# Patient Record
Sex: FEMALE | Race: WHITE | Employment: OTHER | URBAN - METROPOLITAN AREA
[De-identification: names, ages, dates, MRNs, and addresses within clinical notes are randomized per-mention and may not be internally consistent; named-entity substitution may affect disease eponyms.]

---

## 2018-06-11 ENCOUNTER — OFFICE VISIT (OUTPATIENT)
Dept: INTERNAL MEDICINE CLINIC | Age: 53
End: 2018-06-11

## 2018-06-11 VITALS
SYSTOLIC BLOOD PRESSURE: 110 MMHG | HEART RATE: 81 BPM | RESPIRATION RATE: 20 BRPM | HEIGHT: 66 IN | WEIGHT: 226 LBS | OXYGEN SATURATION: 97 % | TEMPERATURE: 98 F | BODY MASS INDEX: 36.32 KG/M2 | DIASTOLIC BLOOD PRESSURE: 70 MMHG

## 2018-06-11 DIAGNOSIS — R27.8 DYSSYNERGIA: ICD-10-CM

## 2018-06-11 DIAGNOSIS — E03.9 HYPOTHYROIDISM, UNSPECIFIED TYPE: ICD-10-CM

## 2018-06-11 DIAGNOSIS — E66.9 DIABETES MELLITUS TYPE 2 IN OBESE (HCC): Primary | ICD-10-CM

## 2018-06-11 DIAGNOSIS — E11.69 DIABETES MELLITUS TYPE 2 IN OBESE (HCC): Primary | ICD-10-CM

## 2018-06-11 DIAGNOSIS — E78.49 OTHER HYPERLIPIDEMIA: ICD-10-CM

## 2018-06-11 DIAGNOSIS — E66.9 DIABETES MELLITUS TYPE 2 IN OBESE (HCC): ICD-10-CM

## 2018-06-11 DIAGNOSIS — G93.2 PSEUDOTUMOR CEREBRI: ICD-10-CM

## 2018-06-11 DIAGNOSIS — Z86.69 HISTORY OF MIGRAINE: ICD-10-CM

## 2018-06-11 DIAGNOSIS — E11.69 DIABETES MELLITUS TYPE 2 IN OBESE (HCC): ICD-10-CM

## 2018-06-11 LAB
A/G RATIO: 1.3 (ref 1.1–2.2)
ALBUMIN SERPL-MCNC: 4.3 G/DL (ref 3.4–5)
ALP BLD-CCNC: 98 U/L (ref 40–129)
ALT SERPL-CCNC: 28 U/L (ref 10–40)
ANION GAP SERPL CALCULATED.3IONS-SCNC: 19 MMOL/L (ref 3–16)
AST SERPL-CCNC: 35 U/L (ref 15–37)
BILIRUB SERPL-MCNC: 0.4 MG/DL (ref 0–1)
BUN BLDV-MCNC: 9 MG/DL (ref 7–20)
CALCIUM SERPL-MCNC: 9.9 MG/DL (ref 8.3–10.6)
CHLORIDE BLD-SCNC: 102 MMOL/L (ref 99–110)
CHOLESTEROL, TOTAL: 189 MG/DL (ref 0–199)
CO2: 18 MMOL/L (ref 21–32)
CREAT SERPL-MCNC: 0.7 MG/DL (ref 0.6–1.1)
CREATININE URINE: 78.7 MG/DL (ref 28–259)
GFR AFRICAN AMERICAN: >60
GFR NON-AFRICAN AMERICAN: >60
GLOBULIN: 3.2 G/DL
GLUCOSE BLD-MCNC: 280 MG/DL (ref 70–99)
HBA1C MFR BLD: 7.6 %
HDLC SERPL-MCNC: 43 MG/DL (ref 40–60)
LDL CHOLESTEROL CALCULATED: 110 MG/DL
MICROALBUMIN UR-MCNC: <1.2 MG/DL
MICROALBUMIN/CREAT UR-RTO: NORMAL MG/G (ref 0–30)
POTASSIUM SERPL-SCNC: 4.2 MMOL/L (ref 3.5–5.1)
SODIUM BLD-SCNC: 139 MMOL/L (ref 136–145)
T4 FREE: 0.7 NG/DL (ref 0.9–1.8)
TOTAL PROTEIN: 7.5 G/DL (ref 6.4–8.2)
TRIGL SERPL-MCNC: 179 MG/DL (ref 0–150)
TSH SERPL DL<=0.05 MIU/L-ACNC: 40.83 UIU/ML (ref 0.27–4.2)
VLDLC SERPL CALC-MCNC: 36 MG/DL

## 2018-06-11 PROCEDURE — 83036 HEMOGLOBIN GLYCOSYLATED A1C: CPT | Performed by: NURSE PRACTITIONER

## 2018-06-11 PROCEDURE — 99204 OFFICE O/P NEW MOD 45 MIN: CPT | Performed by: NURSE PRACTITIONER

## 2018-06-11 PROCEDURE — G8417 CALC BMI ABV UP PARAM F/U: HCPCS | Performed by: NURSE PRACTITIONER

## 2018-06-11 PROCEDURE — 1036F TOBACCO NON-USER: CPT | Performed by: NURSE PRACTITIONER

## 2018-06-11 PROCEDURE — 3045F PR MOST RECENT HEMOGLOBIN A1C LEVEL 7.0-9.0%: CPT | Performed by: NURSE PRACTITIONER

## 2018-06-11 PROCEDURE — 3017F COLORECTAL CA SCREEN DOC REV: CPT | Performed by: NURSE PRACTITIONER

## 2018-06-11 PROCEDURE — 2022F DILAT RTA XM EVC RTNOPTHY: CPT | Performed by: NURSE PRACTITIONER

## 2018-06-11 PROCEDURE — G8427 DOCREV CUR MEDS BY ELIG CLIN: HCPCS | Performed by: NURSE PRACTITIONER

## 2018-06-11 RX ORDER — ATORVASTATIN CALCIUM 20 MG/1
20 TABLET, FILM COATED ORAL DAILY
COMMUNITY
End: 2018-09-17 | Stop reason: SDUPTHER

## 2018-06-11 RX ORDER — ESZOPICLONE 1 MG/1
1 TABLET, FILM COATED ORAL NIGHTLY
COMMUNITY

## 2018-06-11 RX ORDER — CLONAZEPAM 2 MG/1
2 TABLET ORAL
COMMUNITY
End: 2018-06-11 | Stop reason: CLARIF

## 2018-06-11 RX ORDER — LEVOTHYROXINE SODIUM 0.2 MG/1
200 TABLET ORAL DAILY
COMMUNITY
End: 2018-08-06

## 2018-06-11 RX ORDER — CLONAZEPAM 1 MG/1
1 TABLET ORAL 2 TIMES DAILY PRN
COMMUNITY
Start: 2013-08-12

## 2018-06-11 RX ORDER — IBUPROFEN 800 MG/1
800 TABLET ORAL EVERY 6 HOURS PRN
COMMUNITY
Start: 2016-05-26 | End: 2019-06-10 | Stop reason: ALTCHOICE

## 2018-06-11 RX ORDER — POLYETHYLENE GLYCOL 3350 17 G/17G
POWDER, FOR SOLUTION ORAL DAILY PRN
COMMUNITY

## 2018-06-11 RX ORDER — QUETIAPINE FUMARATE 400 MG/1
800 TABLET, FILM COATED ORAL NIGHTLY PRN
COMMUNITY

## 2018-06-11 RX ORDER — LITHIUM CARBONATE 450 MG
450 TABLET, EXTENDED RELEASE ORAL 2 TIMES DAILY
COMMUNITY

## 2018-06-11 RX ORDER — IBUPROFEN 200 MG
200 TABLET ORAL
COMMUNITY
End: 2018-06-11 | Stop reason: CLARIF

## 2018-06-11 RX ORDER — GLIPIZIDE 10 MG/1
TABLET ORAL
COMMUNITY
End: 2018-09-10 | Stop reason: SDUPTHER

## 2018-06-11 RX ORDER — CETIRIZINE HYDROCHLORIDE 10 MG/1
TABLET ORAL
COMMUNITY
Start: 2018-05-10

## 2018-06-11 RX ORDER — TOPIRAMATE 25 MG/1
25 TABLET ORAL 2 TIMES DAILY
COMMUNITY
Start: 2018-04-06

## 2018-06-11 RX ORDER — LEVOTHYROXINE SODIUM 0.1 MG/1
200 TABLET ORAL
COMMUNITY
End: 2018-06-11 | Stop reason: CLARIF

## 2018-06-11 RX ORDER — SENNA PLUS 8.6 MG/1
17.2 TABLET ORAL NIGHTLY PRN
COMMUNITY
Start: 2018-02-26 | End: 2019-06-21

## 2018-06-11 RX ORDER — HYDROXYZINE PAMOATE 50 MG/1
50 CAPSULE ORAL 4 TIMES DAILY PRN
COMMUNITY
Start: 2018-05-03 | End: 2019-09-24

## 2018-06-11 ASSESSMENT — PATIENT HEALTH QUESTIONNAIRE - PHQ9
SUM OF ALL RESPONSES TO PHQ9 QUESTIONS 1 & 2: 1
SUM OF ALL RESPONSES TO PHQ QUESTIONS 1-9: 1
2. FEELING DOWN, DEPRESSED OR HOPELESS: 0
1. LITTLE INTEREST OR PLEASURE IN DOING THINGS: 1

## 2018-06-11 ASSESSMENT — ENCOUNTER SYMPTOMS
CONSTIPATION: 1
SHORTNESS OF BREATH: 0
ABDOMINAL PAIN: 0

## 2018-06-12 ENCOUNTER — TELEPHONE (OUTPATIENT)
Dept: INTERNAL MEDICINE CLINIC | Age: 53
End: 2018-06-12

## 2018-06-12 DIAGNOSIS — E03.9 ACQUIRED HYPOTHYROIDISM: Primary | ICD-10-CM

## 2018-06-12 RX ORDER — LEVOTHYROXINE SODIUM 0.03 MG/1
25 TABLET ORAL DAILY
Qty: 30 TABLET | Refills: 1 | Status: SHIPPED | OUTPATIENT
Start: 2018-06-12 | End: 2018-08-05 | Stop reason: SDUPTHER

## 2018-07-02 ENCOUNTER — TELEPHONE (OUTPATIENT)
Dept: INTERNAL MEDICINE CLINIC | Age: 53
End: 2018-07-02

## 2018-07-03 ENCOUNTER — TELEPHONE (OUTPATIENT)
Dept: INTERNAL MEDICINE CLINIC | Age: 53
End: 2018-07-03

## 2018-07-09 NOTE — TELEPHONE ENCOUNTER
Pt called informed of Jacinta Tanner, Number given to scheduling to schedule a mammogram [phone]

## 2018-08-14 NOTE — TELEPHONE ENCOUNTER
Pt called in stating that she was told by her pharmacy that she needs to get labs done with her PCP before she can get her levothyroxine refilled - this reads true from previous encounter notes. Labs are in the system for TSH w/o Reflex & T4, Free. Pt states she will come in early part of next week to get her thyroid bloodwork done but that she is running low on both her levothyroxine & metformin. Pt did not disclose to me how much medication she has left.     [facility] uses [facility] [place] -- [phone]

## 2018-08-16 DIAGNOSIS — E03.9 ACQUIRED HYPOTHYROIDISM: ICD-10-CM

## 2018-08-17 DIAGNOSIS — E03.9 ACQUIRED HYPOTHYROIDISM: ICD-10-CM

## 2018-08-17 LAB
T4 FREE: 1.2 NG/DL (ref 0.9–1.8)
TSH SERPL DL<=0.05 MIU/L-ACNC: 0.95 UIU/ML (ref 0.27–4.2)

## 2018-08-28 ENCOUNTER — TELEPHONE (OUTPATIENT)
Dept: INTERNAL MEDICINE CLINIC | Age: 53
End: 2018-08-28

## 2018-08-28 NOTE — TELEPHONE ENCOUNTER
Pt called in as a red flag, short of breath , chest pain x2 days Pt advised to go to Emergency Department , Pt states she will go

## 2018-08-31 ENCOUNTER — TELEPHONE (OUTPATIENT)
Dept: INTERNAL MEDICINE CLINIC | Age: 53
End: 2018-08-31

## 2018-09-04 ENCOUNTER — TELEPHONE (OUTPATIENT)
Dept: INTERNAL MEDICINE CLINIC | Age: 53
End: 2018-09-04

## 2018-09-04 NOTE — TELEPHONE ENCOUNTER
Bradley Izaguirre from [facility] is a  for Pt and is wanting to just introduce herself and be added to Pt demographics for future visits and information   Is helping Pt manage care for her migraines    Contact information    [facility]  [address].  [place], [address]    [phone]    Fax [phone] (make sure to put last name Atkins on Fax)

## 2018-09-05 DIAGNOSIS — E03.9 ACQUIRED HYPOTHYROIDISM: ICD-10-CM

## 2018-09-05 RX ORDER — LEVOTHYROXINE SODIUM 0.03 MG/1
25 TABLET ORAL DAILY
Qty: 30 TABLET | Refills: 0 | Status: SHIPPED | OUTPATIENT
Start: 2018-09-05 | End: 2018-10-04 | Stop reason: SDUPTHER

## 2018-09-10 RX ORDER — GLIPIZIDE 10 MG/1
TABLET ORAL
Qty: 60 TABLET | Refills: 0 | Status: SHIPPED | OUTPATIENT
Start: 2018-09-10 | End: 2018-10-06 | Stop reason: SDUPTHER

## 2018-09-10 NOTE — TELEPHONE ENCOUNTER
Medication:   Requested Prescriptions     Pending Prescriptions Disp Refills    metFORMIN (GLUCOPHAGE) 500 MG tablet 60 tablet      Sig: Take 2 tablets by mouth 2 times daily (with meals)    glipiZIDE (GLUCOTROL) 10 MG tablet 60 tablet        Last Filled:  New     Patient Phone Number: [phone] (home)     Last appt: 6/11/2018   Next appt: 9/17/2018    Last Labs DM:   Lab Results   Component Value Date    LABA1C 7.6 06/11/2018     Last Lipid:   Lab Results   Component Value Date    CHOL 189 06/11/2018    TRIG 179 06/11/2018    HDL 43 06/11/2018    LDLCALC 110 06/11/2018     Last PSA: No results found for: PSA  Last Thyroid:   Lab Results   Component Value Date    TSH 0.95 08/16/2018    T4FREE 1.2 08/16/2018       Last OARRS: No flowsheet data found. Preferred Pharmacy:   [facility]/pharmacy #[id number] [address]. Shilpi Carrillo [phone] - F [phone]  [facility].   [address] [place] [address]  Phone: [phone] Fax: [phone]

## 2018-09-17 ENCOUNTER — OFFICE VISIT (OUTPATIENT)
Dept: INTERNAL MEDICINE CLINIC | Age: 53
End: 2018-09-17

## 2018-09-17 VITALS
HEIGHT: 66 IN | OXYGEN SATURATION: 97 % | SYSTOLIC BLOOD PRESSURE: 120 MMHG | WEIGHT: 227 LBS | BODY MASS INDEX: 36.48 KG/M2 | DIASTOLIC BLOOD PRESSURE: 80 MMHG | HEART RATE: 82 BPM

## 2018-09-17 DIAGNOSIS — E66.9 DIABETES MELLITUS TYPE 2 IN OBESE (HCC): Primary | ICD-10-CM

## 2018-09-17 DIAGNOSIS — E11.69 DIABETES MELLITUS TYPE 2 IN OBESE (HCC): Primary | ICD-10-CM

## 2018-09-17 DIAGNOSIS — E78.1 HIGH TRIGLYCERIDES: ICD-10-CM

## 2018-09-17 DIAGNOSIS — Z12.39 BREAST CANCER SCREENING: ICD-10-CM

## 2018-09-17 DIAGNOSIS — E78.2 MIXED HYPERLIPIDEMIA: ICD-10-CM

## 2018-09-17 DIAGNOSIS — E03.9 ACQUIRED HYPOTHYROIDISM: ICD-10-CM

## 2018-09-17 LAB — HBA1C MFR BLD: 8.2 %

## 2018-09-17 PROCEDURE — 3045F PR MOST RECENT HEMOGLOBIN A1C LEVEL 7.0-9.0%: CPT | Performed by: NURSE PRACTITIONER

## 2018-09-17 PROCEDURE — 2022F DILAT RTA XM EVC RTNOPTHY: CPT | Performed by: NURSE PRACTITIONER

## 2018-09-17 PROCEDURE — 99214 OFFICE O/P EST MOD 30 MIN: CPT | Performed by: NURSE PRACTITIONER

## 2018-09-17 PROCEDURE — 1036F TOBACCO NON-USER: CPT | Performed by: NURSE PRACTITIONER

## 2018-09-17 PROCEDURE — G8427 DOCREV CUR MEDS BY ELIG CLIN: HCPCS | Performed by: NURSE PRACTITIONER

## 2018-09-17 PROCEDURE — 83036 HEMOGLOBIN GLYCOSYLATED A1C: CPT | Performed by: NURSE PRACTITIONER

## 2018-09-17 PROCEDURE — G8417 CALC BMI ABV UP PARAM F/U: HCPCS | Performed by: NURSE PRACTITIONER

## 2018-09-17 PROCEDURE — 3017F COLORECTAL CA SCREEN DOC REV: CPT | Performed by: NURSE PRACTITIONER

## 2018-09-17 RX ORDER — ATORVASTATIN CALCIUM 40 MG/1
40 TABLET, FILM COATED ORAL NIGHTLY
Qty: 90 TABLET | Refills: 0 | Status: SHIPPED | OUTPATIENT
Start: 2018-09-17 | End: 2018-12-08 | Stop reason: SDUPTHER

## 2018-09-17 RX ORDER — LEVOTHYROXINE SODIUM 0.2 MG/1
200 TABLET ORAL DAILY
COMMUNITY
End: 2018-11-01 | Stop reason: SDUPTHER

## 2018-09-17 ASSESSMENT — ENCOUNTER SYMPTOMS
ABDOMINAL PAIN: 1
ABDOMINAL DISTENTION: 1
SHORTNESS OF BREATH: 0

## 2018-09-17 NOTE — PATIENT INSTRUCTIONS
Assessment/Plan:   Results for POC orders placed in visit on 09/17/18   POCT glycosylated hemoglobin (Hb A1C)   Result Value Ref Range    Hemoglobin A1C 8.2 %       1. Diabetes mellitus type 2 in obese [facility])  Not at goal  Add the Trulicity (dulaglutide). Use as prescribed. If you need assistance, come in the office for your first injection. Please see [facility] soon. - POCT glycosylated hemoglobin (Hb A1C)  - Dulaglutide 0.75 MG/0.5ML SOPN; Inject 0.75 mg into the skin once a week  Dispense: 4 pen; Refill: 0    2. Acquired hypothyroidism  Stable    3. Mixed hyperlipidemia  Not at goal.  Continue diabetic diet. Increase Lipitor dose to 40mg as prescribed. - atorvastatin (LIPITOR) 40 MG tablet; Take 1 tablet by mouth nightly  Dispense: 90 tablet; Refill: 0    4. High triglycerides  Not at goal.  Continue diabetic diet. Increase Lipitor dose to 40mg as prescribed. - atorvastatin (LIPITOR) 40 MG tablet; Take 1 tablet by mouth nightly  Dispense: 90 tablet; Refill: 0    5. Breast cancer screening  Please have this done soon. - [facility]AN[facility] DIGITAL SCREEN W OR WO CAD BILATERAL; Future        Check with your Neurosurgeon regarding which vaccines are ok (influenza, Tdap, Pneumococcal 23 valent)      Discussed medications with patient, who voiced understanding of their use and indications. All questions answered. Pt is advised to call if symptoms worsen or do not improve.

## 2018-09-24 ENCOUNTER — TELEPHONE (OUTPATIENT)
Dept: INTERNAL MEDICINE CLINIC | Age: 53
End: 2018-09-24

## 2018-09-26 DIAGNOSIS — E66.9 DIABETES MELLITUS TYPE 2 IN OBESE (HCC): Primary | ICD-10-CM

## 2018-09-26 DIAGNOSIS — E11.69 DIABETES MELLITUS TYPE 2 IN OBESE (HCC): Primary | ICD-10-CM

## 2018-09-26 NOTE — TELEPHONE ENCOUNTER
Trulicity rx'd for her diabetes and coverage was denied. I will change this to Victoza, which is a daily medication. Please let her know of the change. Thanks.

## 2018-09-28 NOTE — TELEPHONE ENCOUNTER
Medication:   Requested Prescriptions     Pending Prescriptions Disp Refills    metFORMIN (GLUCOPHAGE) 500 MG tablet [Pharmacy Med Name: METFORMIN  MG TABLET] 60 tablet 0     Sig: TAKE 2 TABLETS BY MOUTH TWICE A DAY WITH MEALS       Last Filled:      Patient Phone Number: [phone] (home)     Last appt: 9/17/2018   Next appt: Visit date not found    Last Labs DM:   Lab Results   Component Value Date    LABA1C 8.2 09/17/2018       Last OARRS: No flowsheet data found. Preferred Pharmacy:   [facility]/pharmacy #[id number] [address]. [facility] [phone] - F [phone]  [facility].   [address] [place] [address] [place] [address]  Phone: [phone] Fax: [phone]

## 2018-10-04 DIAGNOSIS — E03.9 ACQUIRED HYPOTHYROIDISM: ICD-10-CM

## 2018-10-04 RX ORDER — LEVOTHYROXINE SODIUM 0.03 MG/1
25 TABLET ORAL DAILY
Qty: 90 TABLET | Refills: 1 | Status: SHIPPED | OUTPATIENT
Start: 2018-10-04 | End: 2019-03-23 | Stop reason: SDUPTHER

## 2018-10-08 RX ORDER — GLIPIZIDE 10 MG/1
TABLET ORAL
Qty: 60 TABLET | Refills: 2 | Status: SHIPPED | OUTPATIENT
Start: 2018-10-08 | End: 2018-12-17 | Stop reason: SDUPTHER

## 2018-10-17 PROBLEM — Z12.39 BREAST CANCER SCREENING: Status: RESOLVED | Noted: 2018-09-17 | Resolved: 2018-10-17

## 2018-11-01 RX ORDER — LEVOTHYROXINE SODIUM 0.2 MG/1
TABLET ORAL
Qty: 90 TABLET | Refills: 0 | Status: SHIPPED | OUTPATIENT
Start: 2018-11-01 | End: 2018-12-17 | Stop reason: SDUPTHER

## 2018-11-01 NOTE — TELEPHONE ENCOUNTER
Medication:   Requested Prescriptions     Pending Prescriptions Disp Refills    levothyroxine (SYNTHROID) 200 MCG tablet [Pharmacy Med Name: LEVOTHYROXINE 200 MCG TABLET] 90 tablet 0     Sig: TAKE 1 TABLET BY MOUTH EVERY DAY     Last Filled:  10/4/18    Last appt: 9/17/18   Next appt: 12/17/18    Last Thyroid:   Lab Results   Component Value Date    TSH 0.95 08/16/2018    T4FREE 1.2 08/16/2018

## 2018-11-26 ENCOUNTER — CLINICAL DOCUMENTATION (OUTPATIENT)
Dept: INTERNAL MEDICINE CLINIC | Age: 53
End: 2018-11-26

## 2018-12-08 DIAGNOSIS — E78.2 MIXED HYPERLIPIDEMIA: ICD-10-CM

## 2018-12-08 DIAGNOSIS — E78.1 HIGH TRIGLYCERIDES: ICD-10-CM

## 2018-12-10 RX ORDER — ATORVASTATIN CALCIUM 40 MG/1
TABLET, FILM COATED ORAL
Qty: 90 TABLET | Refills: 0 | Status: SHIPPED | OUTPATIENT
Start: 2018-12-10 | End: 2019-03-03 | Stop reason: SDUPTHER

## 2018-12-10 NOTE — TELEPHONE ENCOUNTER
Medication:   Requested Prescriptions     Pending Prescriptions Disp Refills    atorvastatin (LIPITOR) 40 MG tablet [Pharmacy Med Name: ATORVASTATIN 40 MG TABLET] 90 tablet 0     Sig: TAKE 1 TABLET BY MOUTH EVERY DAY AT NIGHT       Last Filled:  09/17/18    Patient Phone Number: [phone] (home)     Last appt: 9/17/2018   Next appt:  12/17/18    Last Lipid:   Lab Results   Component Value Date    CHOL 189 06/11/2018    TRIG 179 06/11/2018    HDL 43 06/11/2018    LDLCALC 110 06/11/2018       Last OARRS: No flowsheet data found. Preferred Pharmacy:   [facility]/pharmacy #[id number] [address]. Vannessa Rule [phone] - F [phone]  [facility].   [address] [place] [address] [place] [address]  Phone: [phone] Fax: [phone]

## 2018-12-17 ENCOUNTER — OFFICE VISIT (OUTPATIENT)
Dept: INTERNAL MEDICINE CLINIC | Age: 53
End: 2018-12-17
Payer: MEDICAID

## 2018-12-17 VITALS
HEIGHT: 66 IN | HEART RATE: 88 BPM | OXYGEN SATURATION: 98 % | DIASTOLIC BLOOD PRESSURE: 82 MMHG | WEIGHT: 229 LBS | SYSTOLIC BLOOD PRESSURE: 120 MMHG | BODY MASS INDEX: 36.8 KG/M2

## 2018-12-17 DIAGNOSIS — E78.49 OTHER HYPERLIPIDEMIA: ICD-10-CM

## 2018-12-17 DIAGNOSIS — Z23 NEED FOR PROPHYLACTIC VACCINATION AGAINST STREPTOCOCCUS PNEUMONIAE (PNEUMOCOCCUS): ICD-10-CM

## 2018-12-17 DIAGNOSIS — Z23 NEEDS FLU SHOT: ICD-10-CM

## 2018-12-17 DIAGNOSIS — R27.8 DYSSYNERGIA: ICD-10-CM

## 2018-12-17 DIAGNOSIS — E11.69 DIABETES MELLITUS TYPE 2 IN OBESE (HCC): ICD-10-CM

## 2018-12-17 DIAGNOSIS — E66.9 DIABETES MELLITUS TYPE 2 IN OBESE (HCC): Primary | ICD-10-CM

## 2018-12-17 DIAGNOSIS — E11.69 DIABETES MELLITUS TYPE 2 IN OBESE (HCC): Primary | ICD-10-CM

## 2018-12-17 DIAGNOSIS — E66.9 DIABETES MELLITUS TYPE 2 IN OBESE (HCC): ICD-10-CM

## 2018-12-17 DIAGNOSIS — K43.9 VENTRAL HERNIA WITHOUT OBSTRUCTION OR GANGRENE: ICD-10-CM

## 2018-12-17 LAB
A/G RATIO: 1.4 (ref 1.1–2.2)
ALBUMIN SERPL-MCNC: 4.4 G/DL (ref 3.4–5)
ALP BLD-CCNC: 88 U/L (ref 40–129)
ALT SERPL-CCNC: 33 U/L (ref 10–40)
ANION GAP SERPL CALCULATED.3IONS-SCNC: 18 MMOL/L (ref 3–16)
AST SERPL-CCNC: 38 U/L (ref 15–37)
BILIRUB SERPL-MCNC: 0.5 MG/DL (ref 0–1)
BUN BLDV-MCNC: 11 MG/DL (ref 7–20)
CALCIUM SERPL-MCNC: 9.9 MG/DL (ref 8.3–10.6)
CHLORIDE BLD-SCNC: 105 MMOL/L (ref 99–110)
CHOLESTEROL, TOTAL: 144 MG/DL (ref 0–199)
CO2: 18 MMOL/L (ref 21–32)
CREAT SERPL-MCNC: 0.7 MG/DL (ref 0.6–1.1)
GFR AFRICAN AMERICAN: >60
GFR NON-AFRICAN AMERICAN: >60
GLOBULIN: 3.2 G/DL
GLUCOSE BLD-MCNC: 204 MG/DL (ref 70–99)
HBA1C MFR BLD: 7.5 %
HDLC SERPL-MCNC: 31 MG/DL (ref 40–60)
LDL CHOLESTEROL CALCULATED: 80 MG/DL
POTASSIUM SERPL-SCNC: 4.5 MMOL/L (ref 3.5–5.1)
SODIUM BLD-SCNC: 141 MMOL/L (ref 136–145)
TOTAL CK: 85 U/L (ref 26–192)
TOTAL PROTEIN: 7.6 G/DL (ref 6.4–8.2)
TRIGL SERPL-MCNC: 166 MG/DL (ref 0–150)
VLDLC SERPL CALC-MCNC: 33 MG/DL

## 2018-12-17 PROCEDURE — 90471 IMMUNIZATION ADMIN: CPT | Performed by: NURSE PRACTITIONER

## 2018-12-17 PROCEDURE — G8417 CALC BMI ABV UP PARAM F/U: HCPCS | Performed by: NURSE PRACTITIONER

## 2018-12-17 PROCEDURE — 90472 IMMUNIZATION ADMIN EACH ADD: CPT | Performed by: NURSE PRACTITIONER

## 2018-12-17 PROCEDURE — G8482 FLU IMMUNIZE ORDER/ADMIN: HCPCS | Performed by: NURSE PRACTITIONER

## 2018-12-17 PROCEDURE — 90688 IIV4 VACCINE SPLT 0.5 ML IM: CPT | Performed by: NURSE PRACTITIONER

## 2018-12-17 PROCEDURE — 3017F COLORECTAL CA SCREEN DOC REV: CPT | Performed by: NURSE PRACTITIONER

## 2018-12-17 PROCEDURE — 99214 OFFICE O/P EST MOD 30 MIN: CPT | Performed by: NURSE PRACTITIONER

## 2018-12-17 PROCEDURE — G8427 DOCREV CUR MEDS BY ELIG CLIN: HCPCS | Performed by: NURSE PRACTITIONER

## 2018-12-17 PROCEDURE — 3045F PR MOST RECENT HEMOGLOBIN A1C LEVEL 7.0-9.0%: CPT | Performed by: NURSE PRACTITIONER

## 2018-12-17 PROCEDURE — 1036F TOBACCO NON-USER: CPT | Performed by: NURSE PRACTITIONER

## 2018-12-17 PROCEDURE — 2022F DILAT RTA XM EVC RTNOPTHY: CPT | Performed by: NURSE PRACTITIONER

## 2018-12-17 PROCEDURE — 83036 HEMOGLOBIN GLYCOSYLATED A1C: CPT | Performed by: NURSE PRACTITIONER

## 2018-12-17 PROCEDURE — 90732 PPSV23 VACC 2 YRS+ SUBQ/IM: CPT | Performed by: NURSE PRACTITIONER

## 2018-12-17 RX ORDER — GLIPIZIDE 10 MG/1
TABLET ORAL
Qty: 180 TABLET | Refills: 1 | Status: SHIPPED | OUTPATIENT
Start: 2018-12-17 | End: 2019-04-03

## 2018-12-17 RX ORDER — LEVOTHYROXINE SODIUM 0.2 MG/1
TABLET ORAL
Qty: 90 TABLET | Refills: 1 | Status: SHIPPED | OUTPATIENT
Start: 2018-12-17 | End: 2019-08-07 | Stop reason: SDUPTHER

## 2018-12-17 ASSESSMENT — ENCOUNTER SYMPTOMS
SHORTNESS OF BREATH: 0
ABDOMINAL PAIN: 0

## 2018-12-17 NOTE — PROGRESS NOTES
Subjective:   Joe Jorge is a 50 y.o. female. YOB: 1965    Date of Visit:  12/17/2018    Chief Complaint   Patient presents with    Hyperlipidemia    Hypothyroidism    Diabetes       HPI     Diabetes mellitus type 2 in obese [facility])  Not at goal last eval in Sept.  Trulicity (dulaglutide) was added. Not covered by [employer]. Changed to Victoza. Tolerating well. She is working hard on her diet. -140's overall. She has had some around 100 as well. Had some lows in 30's. Rare. Acquired hypothyroidism  Stable    Mixed hyperlipidemia, high triglycerides-  Was not at goal.  Diet stressed. Lipitor dose was increased to 40mg as prescribed. Tolerating this well. She is fasting today. She is attending a seminar for gastric bypass at [facility] - for diet. Mom recently dx'd with CAD. Stents placed. [facility] told her she was not a candidate for surgery to repair her hernia.        Past Medical History:   Diagnosis Date    Anxiety     Bipolar disorder ([facility]     Depression     GERD (gastroesophageal reflux disease)     HPV (human papilloma virus) infection 2016    noted in records    Hyperlipidemia     Hypothyroidism     Obesity     Pseudotumor cerebri     Sleep apnea     Type 2 diabetes mellitus without complication ([facility]       Family History   Problem Relation Age of Onset    Cirrhosis Mother         nonalcoholic    Mental Illness Father         paranoid schizophrenia    Diabetes Brother      Social History   Substance Use Topics    Smoking status: Never Smoker    Smokeless tobacco: Never Used    Alcohol use No     Allergies   Allergen Reactions    Dhea Anaphylaxis     Pt states she coded    Other Hives and Anaphylaxis     Ended up in ICU  \"diazide\"    Penicillins Anaphylaxis    [facility] Other (See Comments)     IVP dye  \"coded\"    Diazoxide Hives    Tetracycline Hives     \"gave me the pseudo tumor\"    Valproic Acid     Acetazolamide Rash and Hives

## 2018-12-20 DIAGNOSIS — E66.9 DIABETES MELLITUS TYPE 2 IN OBESE (HCC): ICD-10-CM

## 2018-12-20 DIAGNOSIS — E11.69 DIABETES MELLITUS TYPE 2 IN OBESE (HCC): ICD-10-CM

## 2019-01-23 ENCOUNTER — TELEPHONE (OUTPATIENT)
Dept: ORTHOPEDIC SURGERY | Age: 54
End: 2019-01-23

## 2019-01-23 ENCOUNTER — OFFICE VISIT (OUTPATIENT)
Dept: ORTHOPEDIC SURGERY | Age: 54
End: 2019-01-23
Payer: MEDICAID

## 2019-01-23 ENCOUNTER — OFFICE VISIT (OUTPATIENT)
Dept: INTERNAL MEDICINE CLINIC | Age: 54
End: 2019-01-23
Payer: MEDICAID

## 2019-01-23 VITALS
HEART RATE: 89 BPM | HEIGHT: 66 IN | BODY MASS INDEX: 36.26 KG/M2 | SYSTOLIC BLOOD PRESSURE: 122 MMHG | WEIGHT: 225.6 LBS | DIASTOLIC BLOOD PRESSURE: 75 MMHG

## 2019-01-23 VITALS
SYSTOLIC BLOOD PRESSURE: 128 MMHG | HEIGHT: 66 IN | WEIGHT: 221 LBS | HEART RATE: 78 BPM | DIASTOLIC BLOOD PRESSURE: 82 MMHG | OXYGEN SATURATION: 99 % | BODY MASS INDEX: 35.52 KG/M2

## 2019-01-23 DIAGNOSIS — M75.101 TEAR OF RIGHT ROTATOR CUFF, UNSPECIFIED TEAR EXTENT: Primary | ICD-10-CM

## 2019-01-23 DIAGNOSIS — G89.29 CHRONIC PAIN OF BOTH SHOULDERS: ICD-10-CM

## 2019-01-23 DIAGNOSIS — M25.511 CHRONIC PAIN OF BOTH SHOULDERS: ICD-10-CM

## 2019-01-23 DIAGNOSIS — M25.512 ACUTE PAIN OF BOTH SHOULDERS: ICD-10-CM

## 2019-01-23 DIAGNOSIS — M75.82 TENDONITIS OF LEFT ROTATOR CUFF: ICD-10-CM

## 2019-01-23 DIAGNOSIS — S49.91XD BILATERAL SHOULDER INJURY, SUBSEQUENT ENCOUNTER: Primary | ICD-10-CM

## 2019-01-23 DIAGNOSIS — M25.511 ACUTE PAIN OF BOTH SHOULDERS: ICD-10-CM

## 2019-01-23 DIAGNOSIS — M19.012 ARTHRITIS OF LEFT ACROMIOCLAVICULAR JOINT: ICD-10-CM

## 2019-01-23 DIAGNOSIS — M25.512 CHRONIC PAIN OF BOTH SHOULDERS: ICD-10-CM

## 2019-01-23 DIAGNOSIS — S49.92XD BILATERAL SHOULDER INJURY, SUBSEQUENT ENCOUNTER: Primary | ICD-10-CM

## 2019-01-23 PROCEDURE — 3017F COLORECTAL CA SCREEN DOC REV: CPT | Performed by: NURSE PRACTITIONER

## 2019-01-23 PROCEDURE — 1036F TOBACCO NON-USER: CPT | Performed by: NURSE PRACTITIONER

## 2019-01-23 PROCEDURE — G8427 DOCREV CUR MEDS BY ELIG CLIN: HCPCS | Performed by: NURSE PRACTITIONER

## 2019-01-23 PROCEDURE — 99203 OFFICE O/P NEW LOW 30 MIN: CPT | Performed by: PHYSICIAN ASSISTANT

## 2019-01-23 PROCEDURE — G8417 CALC BMI ABV UP PARAM F/U: HCPCS | Performed by: PHYSICIAN ASSISTANT

## 2019-01-23 PROCEDURE — G8482 FLU IMMUNIZE ORDER/ADMIN: HCPCS | Performed by: PHYSICIAN ASSISTANT

## 2019-01-23 PROCEDURE — 99213 OFFICE O/P EST LOW 20 MIN: CPT | Performed by: NURSE PRACTITIONER

## 2019-01-23 PROCEDURE — G8427 DOCREV CUR MEDS BY ELIG CLIN: HCPCS | Performed by: PHYSICIAN ASSISTANT

## 2019-01-23 PROCEDURE — 3017F COLORECTAL CA SCREEN DOC REV: CPT | Performed by: PHYSICIAN ASSISTANT

## 2019-01-23 PROCEDURE — G8417 CALC BMI ABV UP PARAM F/U: HCPCS | Performed by: NURSE PRACTITIONER

## 2019-01-23 PROCEDURE — 1036F TOBACCO NON-USER: CPT | Performed by: PHYSICIAN ASSISTANT

## 2019-01-23 PROCEDURE — G8482 FLU IMMUNIZE ORDER/ADMIN: HCPCS | Performed by: NURSE PRACTITIONER

## 2019-01-24 ENCOUNTER — TELEPHONE (OUTPATIENT)
Dept: ORTHOPEDIC SURGERY | Age: 54
End: 2019-01-24

## 2019-01-25 RX ORDER — METHYLPREDNISOLONE 4 MG/1
TABLET ORAL
Qty: 1 KIT | Refills: 0 | Status: SHIPPED | OUTPATIENT
Start: 2019-01-25 | End: 2019-02-04 | Stop reason: CLARIF

## 2019-01-28 ENCOUNTER — TELEPHONE (OUTPATIENT)
Dept: ORTHOPEDIC SURGERY | Age: 54
End: 2019-01-28

## 2019-01-28 RX ORDER — NAPROXEN 500 MG/1
500 TABLET ORAL 2 TIMES DAILY WITH MEALS
Qty: 30 TABLET | Refills: 0 | Status: SHIPPED | OUTPATIENT
Start: 2019-01-28 | End: 2019-06-10

## 2019-01-29 ENCOUNTER — CLINICAL DOCUMENTATION (OUTPATIENT)
Dept: INTERNAL MEDICINE CLINIC | Age: 54
End: 2019-01-29

## 2019-02-01 ENCOUNTER — TELEPHONE (OUTPATIENT)
Dept: ORTHOPEDIC SURGERY | Age: 54
End: 2019-02-01

## 2019-02-01 ENCOUNTER — TELEPHONE (OUTPATIENT)
Dept: INTERNAL MEDICINE CLINIC | Age: 54
End: 2019-02-01

## 2019-02-01 DIAGNOSIS — M75.101 TEAR OF RIGHT ROTATOR CUFF, UNSPECIFIED TEAR EXTENT: Primary | ICD-10-CM

## 2019-02-04 ENCOUNTER — OFFICE VISIT (OUTPATIENT)
Dept: INTERNAL MEDICINE CLINIC | Age: 54
End: 2019-02-04
Payer: MEDICAID

## 2019-02-04 ENCOUNTER — HOSPITAL ENCOUNTER (OUTPATIENT)
Dept: CT IMAGING | Age: 54
Discharge: HOME | End: 2019-02-04
Payer: MEDICAID

## 2019-02-04 VITALS
WEIGHT: 229 LBS | DIASTOLIC BLOOD PRESSURE: 80 MMHG | HEART RATE: 82 BPM | SYSTOLIC BLOOD PRESSURE: 110 MMHG | TEMPERATURE: 98.1 F | BODY MASS INDEX: 36.8 KG/M2 | OXYGEN SATURATION: 98 % | HEIGHT: 66 IN

## 2019-02-04 DIAGNOSIS — Z86.19 HISTORY OF HEPATITIS A VIRUS INFECTION: ICD-10-CM

## 2019-02-04 DIAGNOSIS — R10.11 RIGHT UPPER QUADRANT ABDOMINAL PAIN: ICD-10-CM

## 2019-02-04 DIAGNOSIS — R14.0 ABDOMINAL DISTENSION: ICD-10-CM

## 2019-02-04 DIAGNOSIS — R19.5 PALE STOOL: ICD-10-CM

## 2019-02-04 DIAGNOSIS — K62.5 RECTAL BLEEDING: ICD-10-CM

## 2019-02-04 DIAGNOSIS — R10.11 RIGHT UPPER QUADRANT ABDOMINAL PAIN: Primary | ICD-10-CM

## 2019-02-04 LAB
BASOPHILS ABSOLUTE: 0 K/UL (ref 0–0.2)
BASOPHILS RELATIVE PERCENT: 0.4 %
EOSINOPHILS ABSOLUTE: 0.2 K/UL (ref 0–0.6)
EOSINOPHILS RELATIVE PERCENT: 4.4 %
HAV IGM SER IA-ACNC: NORMAL
HCT VFR BLD CALC: 35.5 % (ref 36–48)
HEMOGLOBIN: 11.8 G/DL (ref 12–16)
HEPATITIS B CORE IGM ANTIBODY: NORMAL
HEPATITIS B SURFACE ANTIGEN INTERPRETATION: NORMAL
HEPATITIS C ANTIBODY INTERPRETATION: NORMAL
LYMPHOCYTES ABSOLUTE: 1.3 K/UL (ref 1–5.1)
LYMPHOCYTES RELATIVE PERCENT: 25.6 %
MCH RBC QN AUTO: 28.2 PG (ref 26–34)
MCHC RBC AUTO-ENTMCNC: 33.4 G/DL (ref 31–36)
MCV RBC AUTO: 84.4 FL (ref 80–100)
MONOCYTES ABSOLUTE: 0.5 K/UL (ref 0–1.3)
MONOCYTES RELATIVE PERCENT: 9.9 %
NEUTROPHILS ABSOLUTE: 3.1 K/UL (ref 1.7–7.7)
NEUTROPHILS RELATIVE PERCENT: 59.7 %
PDW BLD-RTO: 16.9 % (ref 12.4–15.4)
PLATELET # BLD: 76 K/UL (ref 135–450)
PLATELET SLIDE REVIEW: ABNORMAL
PMV BLD AUTO: 9.5 FL (ref 5–10.5)
RBC # BLD: 4.2 M/UL (ref 4–5.2)
RBC # BLD: NORMAL 10*6/UL
WBC # BLD: 5.3 K/UL (ref 4–11)

## 2019-02-04 PROCEDURE — G8482 FLU IMMUNIZE ORDER/ADMIN: HCPCS | Performed by: NURSE PRACTITIONER

## 2019-02-04 PROCEDURE — 1036F TOBACCO NON-USER: CPT | Performed by: NURSE PRACTITIONER

## 2019-02-04 PROCEDURE — G8417 CALC BMI ABV UP PARAM F/U: HCPCS | Performed by: NURSE PRACTITIONER

## 2019-02-04 PROCEDURE — 99214 OFFICE O/P EST MOD 30 MIN: CPT | Performed by: NURSE PRACTITIONER

## 2019-02-04 PROCEDURE — 3017F COLORECTAL CA SCREEN DOC REV: CPT | Performed by: NURSE PRACTITIONER

## 2019-02-04 PROCEDURE — 74176 CT ABD & PELVIS W/O CONTRAST: CPT

## 2019-02-04 PROCEDURE — G8427 DOCREV CUR MEDS BY ELIG CLIN: HCPCS | Performed by: NURSE PRACTITIONER

## 2019-02-04 ASSESSMENT — ENCOUNTER SYMPTOMS
CONSTIPATION: 1
ANAL BLEEDING: 1
NAUSEA: 1
ABDOMINAL DISTENTION: 1
SHORTNESS OF BREATH: 1
ABDOMINAL PAIN: 1

## 2019-02-05 ENCOUNTER — TELEPHONE (OUTPATIENT)
Dept: INTERNAL MEDICINE CLINIC | Age: 54
End: 2019-02-05

## 2019-02-05 DIAGNOSIS — R16.0 LIVER ENLARGEMENT: Primary | ICD-10-CM

## 2019-02-05 DIAGNOSIS — R10.11 RUQ PAIN: ICD-10-CM

## 2019-02-05 LAB
A/G RATIO: 1.3 (ref 1.1–2.2)
ALBUMIN SERPL-MCNC: 3.8 G/DL (ref 3.4–5)
ALP BLD-CCNC: 75 U/L (ref 40–129)
ALT SERPL-CCNC: 30 U/L (ref 10–40)
ANION GAP SERPL CALCULATED.3IONS-SCNC: 14 MMOL/L (ref 3–16)
AST SERPL-CCNC: 30 U/L (ref 15–37)
BILIRUB SERPL-MCNC: 0.4 MG/DL (ref 0–1)
BUN BLDV-MCNC: 11 MG/DL (ref 7–20)
CALCIUM SERPL-MCNC: 9.4 MG/DL (ref 8.3–10.6)
CHLORIDE BLD-SCNC: 105 MMOL/L (ref 99–110)
CO2: 19 MMOL/L (ref 21–32)
CREAT SERPL-MCNC: <0.5 MG/DL (ref 0.6–1.1)
GFR AFRICAN AMERICAN: >60
GFR NON-AFRICAN AMERICAN: >60
GLOBULIN: 2.9 G/DL
GLUCOSE BLD-MCNC: 224 MG/DL (ref 70–99)
POTASSIUM SERPL-SCNC: 4.4 MMOL/L (ref 3.5–5.1)
SODIUM BLD-SCNC: 138 MMOL/L (ref 136–145)
TOTAL PROTEIN: 6.7 G/DL (ref 6.4–8.2)

## 2019-02-06 ENCOUNTER — OFFICE VISIT (OUTPATIENT)
Dept: ORTHOPEDIC SURGERY | Age: 54
End: 2019-02-06
Payer: MEDICAID

## 2019-02-06 VITALS — BODY MASS INDEX: 36.8 KG/M2 | WEIGHT: 229 LBS | HEIGHT: 66 IN

## 2019-02-06 DIAGNOSIS — S46.011A STRAIN OF RIGHT ROTATOR CUFF CAPSULE, INITIAL ENCOUNTER: Primary | ICD-10-CM

## 2019-02-06 DIAGNOSIS — G89.29 CHRONIC PAIN IN RIGHT SHOULDER: ICD-10-CM

## 2019-02-06 DIAGNOSIS — M75.51 BURSITIS OF RIGHT SHOULDER: ICD-10-CM

## 2019-02-06 DIAGNOSIS — M25.511 CHRONIC PAIN IN RIGHT SHOULDER: ICD-10-CM

## 2019-02-06 PROCEDURE — 1036F TOBACCO NON-USER: CPT | Performed by: INTERNAL MEDICINE

## 2019-02-06 PROCEDURE — G8417 CALC BMI ABV UP PARAM F/U: HCPCS | Performed by: INTERNAL MEDICINE

## 2019-02-06 PROCEDURE — G8427 DOCREV CUR MEDS BY ELIG CLIN: HCPCS | Performed by: INTERNAL MEDICINE

## 2019-02-06 PROCEDURE — 99202 OFFICE O/P NEW SF 15 MIN: CPT | Performed by: INTERNAL MEDICINE

## 2019-02-06 PROCEDURE — G8482 FLU IMMUNIZE ORDER/ADMIN: HCPCS | Performed by: INTERNAL MEDICINE

## 2019-02-06 PROCEDURE — 3017F COLORECTAL CA SCREEN DOC REV: CPT | Performed by: INTERNAL MEDICINE

## 2019-02-08 ENCOUNTER — TELEPHONE (OUTPATIENT)
Dept: INTERNAL MEDICINE CLINIC | Age: 54
End: 2019-02-08

## 2019-02-25 ENCOUNTER — CLINICAL DOCUMENTATION (OUTPATIENT)
Dept: INTERNAL MEDICINE CLINIC | Age: 54
End: 2019-02-25

## 2019-03-03 DIAGNOSIS — E78.2 MIXED HYPERLIPIDEMIA: ICD-10-CM

## 2019-03-03 DIAGNOSIS — E78.1 HIGH TRIGLYCERIDES: ICD-10-CM

## 2019-03-04 RX ORDER — ATORVASTATIN CALCIUM 40 MG/1
TABLET, FILM COATED ORAL
Qty: 90 TABLET | Refills: 0 | Status: SHIPPED | OUTPATIENT
Start: 2019-03-04 | End: 2019-05-31 | Stop reason: SDUPTHER

## 2019-03-09 DIAGNOSIS — E11.69 DIABETES MELLITUS TYPE 2 IN OBESE (HCC): ICD-10-CM

## 2019-03-09 DIAGNOSIS — E66.9 DIABETES MELLITUS TYPE 2 IN OBESE (HCC): ICD-10-CM

## 2019-03-11 RX ORDER — LIRAGLUTIDE 6 MG/ML
INJECTION SUBCUTANEOUS
Qty: 6 PEN | Refills: 1 | Status: SHIPPED | OUTPATIENT
Start: 2019-03-11 | End: 2019-05-11 | Stop reason: SDUPTHER

## 2019-03-15 RX ORDER — GLUCOSAMINE HCL/CHONDROITIN SU 500-400 MG
100 CAPSULE ORAL 2 TIMES DAILY
Qty: 100 STRIP | Refills: 1 | Status: SHIPPED | OUTPATIENT
Start: 2019-03-15

## 2019-03-15 RX ORDER — GLUCOSAMINE HCL/CHONDROITIN SU 500-400 MG
100 CAPSULE ORAL
COMMUNITY
End: 2019-03-15 | Stop reason: SDUPTHER

## 2019-03-23 DIAGNOSIS — E03.9 ACQUIRED HYPOTHYROIDISM: ICD-10-CM

## 2019-03-25 RX ORDER — LEVOTHYROXINE SODIUM 0.03 MG/1
25 TABLET ORAL DAILY
Qty: 90 TABLET | Refills: 1 | Status: SHIPPED | OUTPATIENT
Start: 2019-03-25 | End: 2019-09-12 | Stop reason: SDUPTHER

## 2019-03-27 LAB
A/G RATIO: 0.9 (ref 1–2)
ALBUMIN SERPL-MCNC: 7.5 G/DL (ref 6.4–8.2)
ALBUMIN SERUM: 3.6 G/DL (ref 3.4–5)
ALP BLD-CCNC: 101 U/L (ref 45–117)
ALT SERPL-CCNC: 45 U/L (ref 12–78)
ANION GAP SERPL CALCULATED.3IONS-SCNC: 7 MMOL/L (ref 7–16)
AST SERPL-CCNC: 40 U/L (ref 15–37)
BILIRUBIN: 0.3 MG/DL (ref 0.2–1)
BUN BLDV-MCNC: 11 MG/DL (ref 7–18)
CALCIUM SERPL-MCNC: 9.2 MG/DL (ref 8.5–10.1)
CHLORIDE BLD-SCNC: 113 MMOL/L (ref 98–107)
CO2: 22 MMOL/L (ref 21–32)
CREATININE + EGFR PANEL: 0.8 MG/DL (ref 0.6–1.3)
GFR AFRICAN AMERICAN: > 60 ML/MIN/1.73M2
GFR NON-AFRICAN AMERICAN: > 60 ML/MIN/1.73M2
GLOBULIN: 3.9 G/DL (ref 2.6–4.2)
GLUCOSE: 122 MG/DL (ref 74–106)
LITHIUM LEVEL: < 0.2 MMOL/L
POTASSIUM SERPL-SCNC: 4.2 MMOL/L (ref 3.5–5.1)
SODIUM BLD-SCNC: 142 MMOL/L (ref 136–145)
TSH SERPL DL<=0.05 MIU/L-ACNC: <0.005 UIU/ML (ref 0.36–3.74)

## 2019-04-03 ENCOUNTER — OFFICE VISIT (OUTPATIENT)
Dept: INTERNAL MEDICINE CLINIC | Age: 54
End: 2019-04-03
Payer: MEDICAID

## 2019-04-03 VITALS
WEIGHT: 218 LBS | DIASTOLIC BLOOD PRESSURE: 84 MMHG | HEART RATE: 88 BPM | BODY MASS INDEX: 35.03 KG/M2 | TEMPERATURE: 97.5 F | SYSTOLIC BLOOD PRESSURE: 128 MMHG | OXYGEN SATURATION: 98 % | HEIGHT: 66 IN

## 2019-04-03 DIAGNOSIS — R10.30 LOWER ABDOMINAL PAIN: ICD-10-CM

## 2019-04-03 DIAGNOSIS — R31.9 HEMATURIA, UNSPECIFIED TYPE: Primary | ICD-10-CM

## 2019-04-03 DIAGNOSIS — Z11.3 SCREEN FOR STD (SEXUALLY TRANSMITTED DISEASE): ICD-10-CM

## 2019-04-03 LAB
BILIRUBIN, POC: ABNORMAL
BLOOD URINE, POC: ABNORMAL
CLARITY, POC: CLEAR
COLOR, POC: YELLOW
GLUCOSE URINE, POC: ABNORMAL
KETONES, POC: ABNORMAL
LEUKOCYTE EST, POC: ABNORMAL
NITRITE, POC: ABNORMAL
PH, POC: 6
PROTEIN, POC: ABNORMAL
SPECIFIC GRAVITY, POC: 1.03
UROBILINOGEN, POC: 0.2

## 2019-04-03 PROCEDURE — G8417 CALC BMI ABV UP PARAM F/U: HCPCS | Performed by: NURSE PRACTITIONER

## 2019-04-03 PROCEDURE — 99214 OFFICE O/P EST MOD 30 MIN: CPT | Performed by: NURSE PRACTITIONER

## 2019-04-03 PROCEDURE — 3017F COLORECTAL CA SCREEN DOC REV: CPT | Performed by: NURSE PRACTITIONER

## 2019-04-03 PROCEDURE — 1036F TOBACCO NON-USER: CPT | Performed by: NURSE PRACTITIONER

## 2019-04-03 PROCEDURE — G8427 DOCREV CUR MEDS BY ELIG CLIN: HCPCS | Performed by: NURSE PRACTITIONER

## 2019-04-03 PROCEDURE — 81002 URINALYSIS NONAUTO W/O SCOPE: CPT | Performed by: NURSE PRACTITIONER

## 2019-04-03 RX ORDER — CIPROFLOXACIN 500 MG/1
500 TABLET, FILM COATED ORAL 2 TIMES DAILY
Qty: 14 TABLET | Refills: 0 | Status: SHIPPED | OUTPATIENT
Start: 2019-04-03 | End: 2019-04-10

## 2019-04-03 NOTE — PROGRESS NOTES
Subjective:   Marva Rooney is a 48 y.o. female. YOB: 1965    Date of Visit:  4/3/2019    Chief Complaint   Patient presents with    Abdominal Pain     been cramping   for 3 months       HPI     Lower abdominal cramping unrelated to BM. Complete hysterectomy for uterine fibroids. Partner was promiscuous about a year ago - she is asking for STD screening. Minimal vaginal d/c. No dysuria. No hematuria. No LMP recorded. Patient has had a hysterectomy.              Past Medical History:   Diagnosis Date    Anxiety     Bipolar disorder ([address]     Depression     GERD (gastroesophageal reflux disease)     HPV (human papilloma virus) infection 2016    noted in records    Hyperlipidemia     Hypothyroidism     Obesity     Pseudotumor cerebri     Sleep apnea     Type 2 diabetes mellitus without complication (HCC)       Past Surgical History:   Procedure Laterality Date    CSF SHUNT      HYSTERECTOMY, VAGINAL         Family History   Problem Relation Age of Onset    Cirrhosis Mother         nonalcoholic    Mental Illness Father         paranoid schizophrenia    Diabetes Brother      Social History     Tobacco Use    Smoking status: Never Smoker    Smokeless tobacco: Never Used   Substance Use Topics    Alcohol use: No     Alcohol/week: 0.0 oz    Drug use: No     Allergies   Allergen Reactions    Dhea Anaphylaxis     Pt states she coded    Other Hives and Anaphylaxis     Ended up in ICU  \"diazide\"    Penicillins Anaphylaxis    [place] [facility] Other (See Comments)     IVP dye  \"coded\"    Diazoxide Hives    Tetracycline Hives     \"gave me the pseudo tumor\"    Valproic Acid     Acetazolamide Rash and Hives     Outpatient Medications Marked as Taking for the 4/3/19 encounter (Office Visit) with GEORGINA Hollis - CNP   Medication Sig Dispense Refill    ciprofloxacin (CIPRO) 500 MG tablet Take 1 tablet by mouth 2 times daily for 7 days 14 tablet 0    levothyroxine  senna ([facility] SENNA) 8.6 MG tablet Take 17.2 mg by mouth nightly as needed               Health Maintenance Due   Topic    Diabetic retinal exam     HIV screen     Hepatitis B Vaccine (1 of 3 - Risk 3-dose series)    DTaP/Tdap/Td vaccine (1 - Tdap)    Breast cancer screen     Shingles Vaccine (1 of 2)       Diagnostics (if applicable):    Review of Systems   Constitutional: Negative for fever. Genitourinary: Positive for pelvic pain and vaginal discharge. Negative for dysuria, frequency, genital sores, hematuria, urgency and vaginal bleeding. Physical Exam   Constitutional: She is oriented to person, place, and time. She appears well-developed and well-nourished. She does not appear ill. No distress. /84   Pulse 88   Temp 97.5 °F (36.4 °C) (Oral)   Ht 5' 6\" (1.676 m)   Wt 218 lb (98.9 kg)   SpO2 98%   BMI 35.19 kg/m²      Afebrile  Weight is trending down. Wt Readings from Last 3 Encounters:  04/03/19 : 218 lb (98.9 kg)  02/06/19 : 229 lb (103.9 kg)  02/04/19 : 229 lb (103.9 kg)     HENT:   Mouth/Throat: Oropharynx is clear and moist.   Cardiovascular: Normal rate, regular rhythm and normal heart sounds. Pulmonary/Chest: Effort normal and breath sounds normal.   Abdominal: Soft. Bowel sounds are normal. She exhibits no distension and no mass. There is tenderness in the right lower quadrant, suprapubic area and left lower quadrant. There is no CVA tenderness. Hernia confirmed negative in the right inguinal area and confirmed negative in the left inguinal area. Genitourinary: There is no rash, tenderness, lesion or injury on the right labia. There is no rash, tenderness, lesion or injury on the left labia. There is bleeding (friable) in the vagina. No erythema or tenderness in the vagina. No foreign body in the vagina. No signs of injury around the vagina. No vaginal discharge found. Genitourinary Comments: Adnexa, uterus are surgically absent.    Lymphadenopathy: No inguinal adenopathy noted on the right or left side. Neurological: She is alert and oriented to person, place, and time. Skin: Skin is warm and dry. Psychiatric: She has a normal mood and affect. Her behavior is normal.   Nursing note and vitals reviewed. Assessment/Plan:     Results for POC orders placed in visit on 04/03/19   POCT Urinalysis no Micro   Result Value Ref Range    Color, UA yellow     Clarity, UA clear     Glucose, UA POC neg     Bilirubin, UA neg     Ketones, UA neg     Spec Grav, UA 1.030     Blood, UA POC small     pH, UA 6.0     Protein, UA POC neg     Urobilinogen, UA 0.2     Leukocytes, UA small     Nitrite, UA neg          1. Hematuria, unspecified type  UTI suspected. Start Cipro. C&S submitted. - URINE CULTURE  - ciprofloxacin (CIPRO) 500 MG tablet; Take 1 tablet by mouth 2 times daily for 7 days  Dispense: 14 tablet; Refill: 0    2. Lower abdominal pain    - ciprofloxacin (CIPRO) 500 MG tablet; Take 1 tablet by mouth 2 times daily for 7 days  Dispense: 14 tablet; Refill: 0    - C. Trachomatis / N. Gonorrhoeae, DNA  - VAGINAL PATHOGENS DNA PANEL  - GENITAL CULTURE    3. Screen for STD (sexually transmitted disease)  Screening done as requested.     - C. Trachomatis / N. Gonorrhoeae, DNA  - VAGINAL PATHOGENS DNA PANEL  - GENITAL CULTURE            Discussed medications with patient, who voiced understanding of their use and indications. All questions answered. Pt is advised to call if symptoms worsen or do not improve.

## 2019-04-03 NOTE — PATIENT INSTRUCTIONS
Assessment/Plan:     Results for POC orders placed in visit on 04/03/19   POCT Urinalysis no Micro   Result Value Ref Range    Color, UA yellow     Clarity, UA clear     Glucose, UA POC neg     Bilirubin, UA neg     Ketones, UA neg     Spec Grav, UA 1.030     Blood, UA POC small     pH, UA 6.0     Protein, UA POC neg     Urobilinogen, UA 0.2     Leukocytes, UA small     Nitrite, UA neg          1. Hematuria, unspecified type  UTI suspected. Start Cipro. C&S submitted. - URINE CULTURE  - ciprofloxacin (CIPRO) 500 MG tablet; Take 1 tablet by mouth 2 times daily for 7 days  Dispense: 14 tablet; Refill: 0    2. Lower abdominal pain    - ciprofloxacin (CIPRO) 500 MG tablet; Take 1 tablet by mouth 2 times daily for 7 days  Dispense: 14 tablet; Refill: 0    - C. Trachomatis / N. Gonorrhoeae, DNA  - VAGINAL PATHOGENS DNA PANEL  - GENITAL CULTURE    3. Screen for STD (sexually transmitted disease)  Screening done as requested.     - C. Trachomatis / N. Gonorrhoeae, DNA  - VAGINAL PATHOGENS DNA PANEL  - GENITAL CULTURE            Discussed medications with patient, who voiced understanding of their use and indications. All questions answered. Pt is advised to call if symptoms worsen or do not improve.

## 2019-04-04 LAB
C TRACH DNA GENITAL QL NAA+PROBE: NEGATIVE
CANDIDA SPECIES, DNA PROBE: NORMAL
GARDNERELLA VAGINALIS, DNA PROBE: NORMAL
N. GONORRHOEAE DNA: NEGATIVE
TRICHOMONAS VAGINALIS DNA: NORMAL

## 2019-04-05 DIAGNOSIS — R31.9 HEMATURIA, UNSPECIFIED TYPE: ICD-10-CM

## 2019-04-05 DIAGNOSIS — R10.30 LOWER ABDOMINAL PAIN: Primary | ICD-10-CM

## 2019-04-05 LAB
GENITAL CULTURE, ROUTINE: NORMAL
URINE CULTURE, ROUTINE: NORMAL

## 2019-04-17 ENCOUNTER — HOSPITAL ENCOUNTER (OUTPATIENT)
Dept: ULTRASOUND IMAGING | Age: 54
Discharge: HOME | End: 2019-04-17
Payer: MEDICAID

## 2019-04-17 DIAGNOSIS — R31.9 HEMATURIA, UNSPECIFIED TYPE: ICD-10-CM

## 2019-04-17 DIAGNOSIS — R10.30 LOWER ABDOMINAL PAIN: ICD-10-CM

## 2019-04-17 PROCEDURE — 76857 US EXAM PELVIC LIMITED: CPT

## 2019-04-22 ENCOUNTER — TELEPHONE (OUTPATIENT)
Dept: INTERNAL MEDICINE CLINIC | Age: 54
End: 2019-04-22

## 2019-04-22 DIAGNOSIS — R31.29 OTHER MICROSCOPIC HEMATURIA: Primary | ICD-10-CM

## 2019-05-11 DIAGNOSIS — E11.69 DIABETES MELLITUS TYPE 2 IN OBESE (HCC): ICD-10-CM

## 2019-05-11 DIAGNOSIS — E66.9 DIABETES MELLITUS TYPE 2 IN OBESE (HCC): ICD-10-CM

## 2019-05-13 RX ORDER — LIRAGLUTIDE 6 MG/ML
INJECTION SUBCUTANEOUS
Qty: 12 PEN | Refills: 1 | Status: SHIPPED | OUTPATIENT
Start: 2019-05-13 | End: 2020-01-01

## 2019-05-13 NOTE — TELEPHONE ENCOUNTER
Medication:   Requested Prescriptions     Pending Prescriptions Disp Refills    VICTOZA 18 MG/3ML SOPN SC injection [Pharmacy Med Name: Bina Ramirez 2-PHAM 18 MG/3 ML PEN]  1     Sig: INJECT 1.2 MG INTO THE SKIN DAILY        Last Filled:      Patient Phone Number: [phone] (home)     Last appt: 4/3/2019   Next appt: 6/5/2019    Last OARRS: No flowsheet data found. Preferred Pharmacy:   [facility]/pharmacy #[id number] [address]. Andressapk Garcia [phone] - F [phone]  [facility].   [facility] [place] [address]  Phone: [phone] Fax: [phone]

## 2019-05-31 DIAGNOSIS — E78.1 HIGH TRIGLYCERIDES: ICD-10-CM

## 2019-05-31 DIAGNOSIS — E78.2 MIXED HYPERLIPIDEMIA: ICD-10-CM

## 2019-05-31 RX ORDER — ATORVASTATIN CALCIUM 40 MG/1
TABLET, FILM COATED ORAL
Qty: 90 TABLET | Refills: 0 | Status: SHIPPED | OUTPATIENT
Start: 2019-05-31 | End: 2019-08-28 | Stop reason: SDUPTHER

## 2019-05-31 NOTE — TELEPHONE ENCOUNTER
Medication:   Requested Prescriptions     Pending Prescriptions Disp Refills    atorvastatin (LIPITOR) 40 MG tablet [Pharmacy Med Name: ATORVASTATIN 40 MG TABLET] 90 tablet 0     Sig: TAKE 1 TABLET BY MOUTH EVERY DAY AT NIGHT       Last Filled:  3/4/2019    Patient Phone Number: [phone] (home)     Last appt: 4/3/2019  Next appt: 6/10/2019    Last Lipid:   Lab Results   Component Value Date    CHOL 144 12/17/2018    TRIG 166 12/17/2018    HDL 31 12/17/2018    LDLCALC 80 12/17/2018

## 2019-06-09 DIAGNOSIS — E11.69 DIABETES MELLITUS TYPE 2 IN OBESE (HCC): ICD-10-CM

## 2019-06-09 DIAGNOSIS — E66.9 DIABETES MELLITUS TYPE 2 IN OBESE (HCC): ICD-10-CM

## 2019-06-10 ENCOUNTER — OFFICE VISIT (OUTPATIENT)
Dept: INTERNAL MEDICINE CLINIC | Age: 54
End: 2019-06-10
Payer: MEDICAID

## 2019-06-10 VITALS
TEMPERATURE: 98.2 F | HEART RATE: 76 BPM | HEIGHT: 66 IN | OXYGEN SATURATION: 97 % | DIASTOLIC BLOOD PRESSURE: 80 MMHG | WEIGHT: 219 LBS | BODY MASS INDEX: 35.2 KG/M2 | SYSTOLIC BLOOD PRESSURE: 120 MMHG

## 2019-06-10 DIAGNOSIS — R31.9 HEMATURIA, UNSPECIFIED TYPE: ICD-10-CM

## 2019-06-10 DIAGNOSIS — K76.6 PORTAL HYPERTENSION (HCC): ICD-10-CM

## 2019-06-10 DIAGNOSIS — N64.52 BILATERAL NIPPLE DISCHARGE: ICD-10-CM

## 2019-06-10 DIAGNOSIS — E11.9 TYPE 2 DIABETES MELLITUS WITHOUT COMPLICATION, WITHOUT LONG-TERM CURRENT USE OF INSULIN (HCC): ICD-10-CM

## 2019-06-10 DIAGNOSIS — E11.9 TYPE 2 DIABETES MELLITUS WITHOUT COMPLICATION, WITHOUT LONG-TERM CURRENT USE OF INSULIN (HCC): Primary | ICD-10-CM

## 2019-06-10 DIAGNOSIS — E66.9 DIABETES MELLITUS TYPE 2 IN OBESE (HCC): ICD-10-CM

## 2019-06-10 DIAGNOSIS — I85.10 SECONDARY ESOPHAGEAL VARICES WITHOUT BLEEDING (HCC): ICD-10-CM

## 2019-06-10 DIAGNOSIS — E03.9 ACQUIRED HYPOTHYROIDISM: ICD-10-CM

## 2019-06-10 DIAGNOSIS — E11.69 DIABETES MELLITUS TYPE 2 IN OBESE (HCC): ICD-10-CM

## 2019-06-10 LAB
CREATININE URINE: 60.6 MG/DL (ref 28–259)
HBA1C MFR BLD: 6.9 %
MICROALBUMIN UR-MCNC: <1.2 MG/DL
MICROALBUMIN/CREAT UR-RTO: NORMAL MG/G (ref 0–30)
PROLACTIN: 6.8 NG/ML
T4 FREE: 1.5 NG/DL (ref 0.9–1.8)
TSH SERPL DL<=0.05 MIU/L-ACNC: 0.02 UIU/ML (ref 0.27–4.2)

## 2019-06-10 PROCEDURE — 2022F DILAT RTA XM EVC RTNOPTHY: CPT | Performed by: NURSE PRACTITIONER

## 2019-06-10 PROCEDURE — 3044F HG A1C LEVEL LT 7.0%: CPT | Performed by: NURSE PRACTITIONER

## 2019-06-10 PROCEDURE — 99214 OFFICE O/P EST MOD 30 MIN: CPT | Performed by: NURSE PRACTITIONER

## 2019-06-10 PROCEDURE — 83036 HEMOGLOBIN GLYCOSYLATED A1C: CPT | Performed by: NURSE PRACTITIONER

## 2019-06-10 PROCEDURE — 1036F TOBACCO NON-USER: CPT | Performed by: NURSE PRACTITIONER

## 2019-06-10 PROCEDURE — 3017F COLORECTAL CA SCREEN DOC REV: CPT | Performed by: NURSE PRACTITIONER

## 2019-06-10 PROCEDURE — G8427 DOCREV CUR MEDS BY ELIG CLIN: HCPCS | Performed by: NURSE PRACTITIONER

## 2019-06-10 PROCEDURE — G8417 CALC BMI ABV UP PARAM F/U: HCPCS | Performed by: NURSE PRACTITIONER

## 2019-06-10 RX ORDER — AMITRIPTYLINE HYDROCHLORIDE 50 MG/1
TABLET, FILM COATED ORAL
Refills: 2 | COMMUNITY
Start: 2019-05-24 | End: 2020-01-01 | Stop reason: ALTCHOICE

## 2019-06-10 RX ORDER — PANTOPRAZOLE SODIUM 40 MG/1
TABLET, DELAYED RELEASE ORAL
COMMUNITY
Start: 2019-05-14

## 2019-06-10 RX ORDER — NADOLOL 20 MG/1
TABLET ORAL
COMMUNITY
Start: 2019-05-14

## 2019-06-10 RX ORDER — GLIPIZIDE 10 MG/1
10 TABLET ORAL
COMMUNITY
Start: 2019-04-21 | End: 2020-01-01

## 2019-06-10 ASSESSMENT — PATIENT HEALTH QUESTIONNAIRE - PHQ9
SUM OF ALL RESPONSES TO PHQ QUESTIONS 1-9: 0
SUM OF ALL RESPONSES TO PHQ QUESTIONS 1-9: 0
SUM OF ALL RESPONSES TO PHQ9 QUESTIONS 1 & 2: 0
1. LITTLE INTEREST OR PLEASURE IN DOING THINGS: 0
2. FEELING DOWN, DEPRESSED OR HOPELESS: 0

## 2019-06-10 NOTE — TELEPHONE ENCOUNTER
Medication:   Requested Prescriptions     Pending Prescriptions Disp Refills    metFORMIN (GLUCOPHAGE) 1000 MG tablet [Pharmacy Med Name: METFORMIN HCL 1,000 MG TABLET] 180 tablet 1     Sig: TAKE 1 TABLET BY MOUTH TWICE A DAY WITH MEALS       Last Filled:      Patient Phone Number: [phone] (home)     Last appt: 4/3/2019   Next appt: 6/10/2019    Last Labs DM:   Lab Results   Component Value Date    LABA1C 7.5 12/17/2018       Last OARRS: No flowsheet data found. Preferred Pharmacy:   [facility]/pharmacy #[id number] [address]. [facility] [phone] - F [phone]  [facility].   [facility] [place] [address]  Phone: [phone] Fax: [phone]

## 2019-06-10 NOTE — PROGRESS NOTES
Tetracycline Hives     \"gave me the pseudo tumor\"    Valproic Acid     Acetazolamide Rash and Hives     Outpatient Medications Marked as Taking for the 6/10/19 encounter (Office Visit) with GEORGINA Nagy - CNP   Medication Sig Dispense Refill    metFORMIN (GLUCOPHAGE) 1000 MG tablet TAKE 1 TABLET BY MOUTH TWICE A DAY WITH MEALS 180 tablet 1    glipiZIDE (GLUCOTROL) 10 MG tablet Take 10 mg by mouth      nadolol (CORGARD) 20 MG tablet TAKE 1 TABLET BY MOUTH DAILY. INDICATIONS: PORTAL HYPERTENSION      pantoprazole (PROTONIX) 40 MG tablet TAKE 1 TABLET BY MOUTH EVERY DAY BEFORE BREAKFAST      SUMAtriptan Succinate (IMITREX PO) INJECT 0.5ML ONCE AS NEEDED FOR MIGRAINE FOR 1 DOSE. MAY REPEAT IN 1 HOUR. MAX 2/DAY. MAX 2 DAYS/WEE      atorvastatin (LIPITOR) 40 MG tablet TAKE 1 TABLET BY MOUTH EVERY DAY AT NIGHT 90 tablet 0    VICTOZA 18 MG/3ML SOPN SC injection INJECT 1.2 MG INTO THE SKIN DAILY 12 pen 1    levothyroxine (SYNTHROID) 25 MCG tablet TAKE 1 TABLET BY MOUTH DAILY TAKEN WITH 200MCG TABLET FOR TOTAL DAILY DOSE  MCG. 90 tablet 1    blood glucose test strips (ASCENSIA AUTODISC VI;ONE TOUCH ULTRA TEST VI) strip 1 each by In Vitro route daily As needed.  blood glucose monitor strips 100 strips by Other route 2 times daily Test   2  times a day & as needed for symptoms of irregular blood glucose. Patient  Uses  True  Metric  Meter  Needs test strips 100 strip 1    EASY TOUCH PEN NEEDLES 29G X 12MM MISC FOR USE WITH VICTOZIA PEN DAILY 100 each 3    levothyroxine (SYNTHROID) 200 MCG tablet TAKE 1 TABLET BY MOUTH EVERY DAY 90 tablet 1    eszopiclone (LUNESTA) 1 MG TABS Take 1 mg by mouth nightly.  Thurnell Royal linaclotide (LINZESS) 145 MCG capsule Take 145 mcg by mouth every morning (before breakfast)       lithium (ESKALITH) 450 MG extended release tablet Take 450 mg by mouth 2 times daily       polyethylene glycol (GLYCOLAX) packet Take by mouth daily as needed       QUEtiapine (SEROQUEL) 400 MG tablet Take 800 mg by mouth nightly as needed       hydrOXYzine (VISTARIL) 50 MG capsule Take 50 mg by mouth 4 times daily as needed       topiramate (TOPAMAX) 25 MG tablet Take 25 mg by mouth 2 times daily       cetirizine (ZYRTEC) 10 MG tablet Take one tablet at bedtime      clonazePAM (KLONOPIN) 1 MG tablet Take 1 mg by mouth 2 times daily as needed. Miguel Raza senna ([facility] SENNA) 8.6 MG tablet Take 17.2 mg by mouth nightly as needed             Health Maintenance Due   Topic    Diabetic retinal exam     HIV screen     Hepatitis B Vaccine (1 of 3 - Risk 3-dose series)    DTaP/Tdap/Td vaccine (1 - Tdap)    Breast cancer screen     Shingles Vaccine (1 of 2)    Diabetic foot exam        Diagnostics (if applicable):    Review of Systems   Cardiovascular: Negative for chest pain. Gastrointestinal: Positive for abdominal distention and abdominal pain. Skin: Negative for wound. Psychiatric/Behavioral: Positive for dysphoric mood. Negative for self-injury and suicidal ideas. Physical Exam   Constitutional: She is oriented to person, place, and time. She appears well-developed and well-nourished. No distress. /80   Pulse 76   Temp 98.2 °F (36.8 °C) (Oral)   Ht 5' 6\" (1.676 m)   Wt 219 lb (99.3 kg)   SpO2 97%   BMI 35.35 kg/m²     Wt Readings from Last 3 Encounters:  06/10/19 : 219 lb (99.3 kg)  04/03/19 : 218 lb (98.9 kg)  02/06/19 : 229 lb (103.9 kg)     Neck: No thyroid mass and no thyromegaly present. Cardiovascular: Normal rate, regular rhythm and normal heart sounds. Pulmonary/Chest: Effort normal and breath sounds normal. No respiratory distress. Right breast exhibits no inverted nipple, no mass, no nipple discharge, no skin change and no tenderness. Left breast exhibits no inverted nipple, no mass, no nipple discharge, no skin change and no tenderness. No breast swelling, tenderness, discharge or bleeding.  Breasts are symmetrical.   Abdominal: There is generalized tenderness. There is no rigidity, no rebound and no guarding. Lymphadenopathy:     She has no cervical adenopathy. She has no axillary adenopathy. Neurological: She is alert and oriented to person, place, and time. Skin: Skin is warm and dry. Sensory exam of the foot is normal, tested with the monofilament. Good pulses, no lesions or ulcers, good peripheral pulses. Psychiatric: She has a normal mood and affect. Her behavior is normal. Judgment and thought content normal.   Nursing note and vitals reviewed. Assessment/Plan:     Results for POC orders placed in visit on 06/10/19   POCT glycosylated hemoglobin (Hb A1C)   Result Value Ref Range    Hemoglobin A1C 6.9 %   POCT Urinalysis no Micro   Result Value Ref Range    Color, UA yellow     Clarity, UA cloudy     Glucose, UA POC neg     Bilirubin, UA neg     Ketones, UA neg     Spec Grav, UA 1.020     Blood, UA POC trace     pH, UA 6.0     Protein, UA POC neg     Urobilinogen, UA 0.2     Leukocytes, UA small     Nitrite, UA neg        1. Type 2 diabetes mellitus without complication, without long-term current use of insulin (HCC)  Improved! The current medical regimen is effective;  continue present plan and medications. Will watch closely. Follow up in 3 to 4 months.    - POCT glycosylated hemoglobin (Hb A1C)  - glipiZIDE (GLUCOTROL) 10 MG tablet; Take 10 mg by mouth  - Microalbumin / Creatinine Urine Ratio; Future    2. Acquired hypothyroidism  TSH very low in March - check labs and adjust dose of Synthroid, if indicated. - TSH without Reflex; Future  - T4, Free; Future    3. Hematuria, unspecified type  In house U/A cancelled - I ordered lab u/a with microanalysis      4. Portal hypertension ([address]  New  Follow up with GI as planned    5. Secondary esophageal varices without bleeding ([address]  New  Follow up with GI as planned    6.  Bilateral nipple discharge  New  Check diagnostic mammogram and labs    - PROLACTIN; Future  - [facility] DIGITAL DIAGNOSTIC W OR WO CAD BILATERAL; Future        Discussed medications with patient, who voiced understanding of their use and indications. All questions answered. Pt is advised to call if symptoms worsen or do not improve.

## 2019-06-11 ASSESSMENT — ENCOUNTER SYMPTOMS
ABDOMINAL PAIN: 1
ABDOMINAL DISTENTION: 1

## 2019-06-12 DIAGNOSIS — R31.29 OTHER MICROSCOPIC HEMATURIA: Primary | ICD-10-CM

## 2019-06-12 DIAGNOSIS — R82.71 BACTERIURIA: ICD-10-CM

## 2019-06-12 DIAGNOSIS — R31.29 OTHER MICROSCOPIC HEMATURIA: ICD-10-CM

## 2019-06-12 LAB
BACTERIA: ABNORMAL /HPF
BILIRUBIN URINE: NEGATIVE
BLOOD, URINE: NEGATIVE
CLARITY: CLEAR
COLOR: YELLOW
EPITHELIAL CELLS, UA: 1 /HPF (ref 0–5)
GLUCOSE URINE: NEGATIVE MG/DL
HYALINE CASTS: 0 /LPF (ref 0–8)
KETONES, URINE: NEGATIVE MG/DL
LEUKOCYTE ESTERASE, URINE: ABNORMAL
MICROSCOPIC EXAMINATION: YES
NITRITE, URINE: NEGATIVE
PH UA: 6 (ref 5–8)
PROTEIN UA: NEGATIVE MG/DL
RBC UA: 2 /HPF (ref 0–4)
SPECIFIC GRAVITY UA: 1.01 (ref 1–1.03)
UROBILINOGEN, URINE: 0.2 E.U./DL
WBC UA: 6 /HPF (ref 0–5)

## 2019-06-14 ENCOUNTER — TELEPHONE (OUTPATIENT)
Dept: INTERNAL MEDICINE CLINIC | Age: 54
End: 2019-06-14

## 2019-06-18 ENCOUNTER — TELEPHONE (OUTPATIENT)
Dept: INTERNAL MEDICINE CLINIC | Age: 54
End: 2019-06-18

## 2019-06-18 DIAGNOSIS — E11.69 DIABETES MELLITUS TYPE 2 IN OBESE (HCC): ICD-10-CM

## 2019-06-18 DIAGNOSIS — E66.9 DIABETES MELLITUS TYPE 2 IN OBESE (HCC): ICD-10-CM

## 2019-06-18 RX ORDER — GLIPIZIDE 10 MG/1
TABLET ORAL
Qty: 180 TABLET | Refills: 1 | Status: SHIPPED | OUTPATIENT
Start: 2019-06-18 | End: 2019-12-01 | Stop reason: SDUPTHER

## 2019-06-18 NOTE — TELEPHONE ENCOUNTER
Pt would like to establish care with Dr. Mukesh Bell, appt scheduled for 6/21/2019.      Medication:   Requested Prescriptions     Pending Prescriptions Disp Refills    glipiZIDE (GLUCOTROL) 10 MG tablet [Pharmacy Med Name: GLIPIZIDE 10 MG TABLET] 180 tablet 1     Sig: TAKE 1 TABLET BY MOUTH EVERY MORNING AND TAKE 1 TABLET BY MOUTH AT BEDTIME       Last Filled:  4/21/2019    Patient Phone Number: [phone] (home)     Last appt: 6/10/2019   Next appt: 6/21/2019    Last Labs DM:   Lab Results   Component Value Date    LABA1C 6.9 06/10/2019     Last Lipid:   Lab Results   Component Value Date    CHOL 144 12/17/2018    TRIG 166 12/17/2018    HDL 31 12/17/2018    LDLCALC 80 12/17/2018     Last PSA: No results found for: PSA  Last Thyroid:   Lab Results   Component Value Date    TSH 0.02 06/10/2019    T4FREE 1.5 06/10/2019

## 2019-06-18 NOTE — TELEPHONE ENCOUNTER
[facility] [employer] asking for an order for Right Ultrasound Biopsy for the Pt.    Phone # [phone]  Fax # [phone]

## 2019-06-21 ENCOUNTER — OFFICE VISIT (OUTPATIENT)
Dept: INTERNAL MEDICINE CLINIC | Age: 54
End: 2019-06-21
Payer: MEDICAID

## 2019-06-21 VITALS
HEART RATE: 75 BPM | BODY MASS INDEX: 35.84 KG/M2 | WEIGHT: 223 LBS | OXYGEN SATURATION: 98 % | HEIGHT: 66 IN | SYSTOLIC BLOOD PRESSURE: 118 MMHG | DIASTOLIC BLOOD PRESSURE: 74 MMHG

## 2019-06-21 DIAGNOSIS — N63.10 MASS OF BREAST, RIGHT: Chronic | ICD-10-CM

## 2019-06-21 DIAGNOSIS — E11.9 TYPE 2 DIABETES MELLITUS WITHOUT COMPLICATION, WITHOUT LONG-TERM CURRENT USE OF INSULIN (HCC): Primary | Chronic | ICD-10-CM

## 2019-06-21 DIAGNOSIS — N30.00 ACUTE CYSTITIS WITHOUT HEMATURIA: ICD-10-CM

## 2019-06-21 DIAGNOSIS — E03.9 ACQUIRED HYPOTHYROIDISM: ICD-10-CM

## 2019-06-21 DIAGNOSIS — E78.2 MIXED HYPERLIPIDEMIA: Chronic | ICD-10-CM

## 2019-06-21 DIAGNOSIS — G93.2 PSEUDOTUMOR CEREBRI: ICD-10-CM

## 2019-06-21 PROCEDURE — 3044F HG A1C LEVEL LT 7.0%: CPT | Performed by: INTERNAL MEDICINE

## 2019-06-21 PROCEDURE — 99214 OFFICE O/P EST MOD 30 MIN: CPT | Performed by: INTERNAL MEDICINE

## 2019-06-21 PROCEDURE — 2022F DILAT RTA XM EVC RTNOPTHY: CPT | Performed by: INTERNAL MEDICINE

## 2019-06-21 PROCEDURE — 3014F SCREEN MAMMO DOC REV: CPT | Performed by: INTERNAL MEDICINE

## 2019-06-21 PROCEDURE — G8427 DOCREV CUR MEDS BY ELIG CLIN: HCPCS | Performed by: INTERNAL MEDICINE

## 2019-06-21 PROCEDURE — G8417 CALC BMI ABV UP PARAM F/U: HCPCS | Performed by: INTERNAL MEDICINE

## 2019-06-21 PROCEDURE — 3017F COLORECTAL CA SCREEN DOC REV: CPT | Performed by: INTERNAL MEDICINE

## 2019-06-21 PROCEDURE — 1036F TOBACCO NON-USER: CPT | Performed by: INTERNAL MEDICINE

## 2019-06-21 NOTE — ASSESSMENT & PLAN NOTE
On lipitor,  Patient is compliant w medications, no side effects, effective, provides adequate symptom relief. No new symptoms or problems as noted by patient. The problem is stable, no changes noted by patient. Will consider monitoring labs and refill medications as appropriate. Patient counseled and will continue current plan.

## 2019-06-21 NOTE — PROGRESS NOTES
Subjective:      Patient ID: Merrianne Meigs is a 48 y.o. female. HPI  Established patient here for a visit to manage chronic medical conditions as detailed below. Type 2 diabetes mellitus without complication, without long-term current use of insulin (HCC)  Diabetes Mellitus Type II:  Home blood sugar records reviewed: fasting range: 120-140. No significant episodes of hypoglycemia. Compliant with medications. No polyuria, polydipsia, visual changes, foot problems, GI upset. Diabetic diet compliance:  compliant all of the time. Current exercise: none. Will check labs, and refill medications as appropriate. Hypertension:  Denies CP, SOB, visual changes, dizziness, palpitations or HA. She is adherent to a low sodium diet. Blood pressure typically runs ok  outside of the office. Continue current medications. Hyperlipidemia:  No new myalgias or GI upset on current medications. Lab Results   Component Value Date    LABA1C 6.9 06/10/2019    LABA1C 7.5 12/17/2018    LABA1C 8.2 09/17/2018     Lab Results   Component Value Date    LABMICR <1.20 06/10/2019    LABMICR YES 06/10/2019    CREATININE <0.5 (L) 02/04/2019     Lab Results   Component Value Date    ALT 45 03/27/2019    AST 40 (A) 03/27/2019     No components found for: CHLPL  Lab Results   Component Value Date    TRIG 166 (H) 12/17/2018     Lab Results   Component Value Date    HDL 31 (L) 12/17/2018     Lab Results   Component Value Date    LDLCALC 80 12/17/2018         BP Readings from Last 2 Encounters:   06/21/19 118/74   06/10/19 120/80     Hemoglobin A1C (%)   Date Value   06/10/2019 6.9     Microscopic Examination (no units)   Date Value   06/10/2019 YES     Microalbumin, Random Urine (mg/dL)   Date Value   06/10/2019 <1.20     LDL Calculated (mg/dL)   Date Value   12/17/2018 80              Tobacco use:  Patient  reports that she has never smoked. She has never used smokeless tobacco.    If Smoker - Cessation materials given?  NA    Is Daily aspirin on medication list?:  Yes    Diabetic retinal exam done? Yes   If yes, document in Health Maintenance. Monofilament placed on counter? Yes    Shoes and socks removed? Yes    BP taken with correct size cuff? Yes   Repeated if > 130/80 Yes     Microalbumin performed if applicable? Yes      Acquired hypothyroidism  On synthroid,  Patient is compliant w medications, no side effects, effective, provides adequate symptom relief. No new symptoms or problems as noted by patient. The problem is stable, no changes noted by patient. Will consider monitoring labs and refill medications as appropriate. Patient counseled and will continue current plan. Mixed hyperlipidemia  On lipitor,  Patient is compliant w medications, no side effects, effective, provides adequate symptom relief. No new symptoms or problems as noted by patient. The problem is stable, no changes noted by patient. Will consider monitoring labs and refill medications as appropriate. Patient counseled and will continue current plan. Pseudotumor cerebri  On nadolol,  Patient is compliant w medications, no side effects, effective, provides adequate symptom relief. No new symptoms or problems as noted by patient. The problem is stable, no changes noted by patient. Will consider monitoring labs and refill medications as appropriate. Patient counseled and will continue current plan. Mass of breast, right  Showed up on the mammogram,  Scheduled for a US guided biposy of the same,   Will f/u on this,. Review of Systems  ROS: No unusual headaches or allergy symptoms or blurred vision. No prolonged cough. No flushing or facial pain or chest pain,dizziness, dyspnea, palpitations, or chest pain on exertion. No syncope. No nausea or vommitting or diarrhea. No jaundice or abdominal pain, change in bowel habits, black or bloody stools. No dysuria or hematuria or frequency of urination. No myalgias or muscle pain.   No numbness, weakness, or tingling. No falls, or loss of consciousness. No weight loss or back pain. No falls. No paresthesias. No joint swelling or redness. No joint pain. No recent weight loss. No focal weakness or sensory deficits or paresthesias, No confusion or altered sensorium. No hematemesis. No hearing loss. No siezures. All other systems were reviewed, and review was negative. Objective:   Physical Exam  /74 (Site: Right Upper Arm, Position: Sitting, Cuff Size: Medium Adult)   Pulse 75   Ht 5' 6\" (1.676 m)   Wt 223 lb (101.2 kg)   SpO2 98%   BMI 35.99 kg/m²    The physical exam reveals a patient who appears well, alert and oriented x 3, pleasant, cooperative. Vitals are as noted. Head is atraumatic and normocephalic. Eyes reveal normal conjunctiva, cornea normal, pupils are equal and rective to light. Nasal mucosa is normal. Throat is normal without exudates. Ears reveal normal tympanic membranes. Neck is supple and free of adenopathy, or masses. No thyromegaly. No jugular venous distension. Lungs are clear to auscultation, no rales or rhonchi noted. Heart sounds are regular , no murmurs, clicks, gallops or rubs. Abdomen is soft, no tenderness, masses or organomegaly. Bowel sounds are normally heard. Pelvis: normal. Extremities are normal. Peripheral pulses are normal. Screening neurological exam is normal without focal findings. Cranial nerves are intact, reflexes are symmetrical and muscle strength eaqual. Skin is normal without suspicious lesions noted. Assessment:      Type 2 diabetes mellitus without complication, without long-term current use of insulin ([facility])  Diabetes Mellitus Type II:  Home blood sugar records reviewed: fasting range: 120-140. No significant episodes of hypoglycemia. Compliant with medications. No polyuria, polydipsia, visual changes, foot problems, GI upset. Diabetic diet compliance:  compliant all of the time. Current exercise: none.  Will check labs, and refill medications as appropriate. Hypertension:  Denies CP, SOB, visual changes, dizziness, palpitations or HA. She is adherent to a low sodium diet. Blood pressure typically runs ok  outside of the office. Continue current medications. Hyperlipidemia:  No new myalgias or GI upset on current medications. Lab Results   Component Value Date    LABA1C 6.9 06/10/2019    LABA1C 7.5 12/17/2018    LABA1C 8.2 09/17/2018     Lab Results   Component Value Date    LABMICR <1.20 06/10/2019    LABMICR YES 06/10/2019    CREATININE <0.5 (L) 02/04/2019     Lab Results   Component Value Date    ALT 45 03/27/2019    AST 40 (A) 03/27/2019     No components found for: CHLPL  Lab Results   Component Value Date    TRIG 166 (H) 12/17/2018     Lab Results   Component Value Date    HDL 31 (L) 12/17/2018     Lab Results   Component Value Date    LDLCALC 80 12/17/2018         BP Readings from Last 2 Encounters:   06/21/19 118/74   06/10/19 120/80     Hemoglobin A1C (%)   Date Value   06/10/2019 6.9     Microscopic Examination (no units)   Date Value   06/10/2019 YES     Microalbumin, Random Urine (mg/dL)   Date Value   06/10/2019 <1.20     LDL Calculated (mg/dL)   Date Value   12/17/2018 80              Tobacco use:  Patient  reports that she has never smoked. She has never used smokeless tobacco.    If Smoker - Cessation materials given? NA    Is Daily aspirin on medication list?:  Yes    Diabetic retinal exam done? Yes   If yes, document in Health Maintenance. Monofilament placed on counter? Yes    Shoes and socks removed? Yes    BP taken with correct size cuff? Yes   Repeated if > 130/80 Yes     Microalbumin performed if applicable? Yes      Acquired hypothyroidism  On synthroid,  Patient is compliant w medications, no side effects, effective, provides adequate symptom relief. No new symptoms or problems as noted by patient. The problem is stable, no changes noted by patient.  Will consider monitoring labs and refill medications as appropriate. Patient counseled and will continue current plan. Mixed hyperlipidemia  On lipitor,  Patient is compliant w medications, no side effects, effective, provides adequate symptom relief. No new symptoms or problems as noted by patient. The problem is stable, no changes noted by patient. Will consider monitoring labs and refill medications as appropriate. Patient counseled and will continue current plan. Pseudotumor cerebri  On nadolol,  Patient is compliant w medications, no side effects, effective, provides adequate symptom relief. No new symptoms or problems as noted by patient. The problem is stable, no changes noted by patient. Will consider monitoring labs and refill medications as appropriate. Patient counseled and will continue current plan. Mass of breast, right  Showed up on the mammogram,  Scheduled for a US guided biposy of the same,   Will f/u on this,. Plan:      Refill meds,  Treat UTI,  Continue current medications. F/u in 3 months.         Nicole Jennings MD

## 2019-06-21 NOTE — ASSESSMENT & PLAN NOTE
On nadolol,  Patient is compliant w medications, no side effects, effective, provides adequate symptom relief. No new symptoms or problems as noted by patient. The problem is stable, no changes noted by patient. Will consider monitoring labs and refill medications as appropriate. Patient counseled and will continue current plan.

## 2019-06-21 NOTE — ASSESSMENT & PLAN NOTE
Diabetes Mellitus Type II:  Home blood sugar records reviewed: fasting range: 120-140. No significant episodes of hypoglycemia. Compliant with medications. No polyuria, polydipsia, visual changes, foot problems, GI upset. Diabetic diet compliance:  compliant all of the time. Current exercise: none. Will check labs, and refill medications as appropriate. Hypertension:  Denies CP, SOB, visual changes, dizziness, palpitations or HA. She is adherent to a low sodium diet. Blood pressure typically runs ok  outside of the office. Continue current medications. Hyperlipidemia:  No new myalgias or GI upset on current medications. Lab Results   Component Value Date    LABA1C 6.9 06/10/2019    LABA1C 7.5 12/17/2018    LABA1C 8.2 09/17/2018     Lab Results   Component Value Date    LABMICR <1.20 06/10/2019    LABMICR YES 06/10/2019    CREATININE <0.5 (L) 02/04/2019     Lab Results   Component Value Date    ALT 45 03/27/2019    AST 40 (A) 03/27/2019     No components found for: CHLPL  Lab Results   Component Value Date    TRIG 166 (H) 12/17/2018     Lab Results   Component Value Date    HDL 31 (L) 12/17/2018     Lab Results   Component Value Date    LDLCALC 80 12/17/2018         BP Readings from Last 2 Encounters:   06/21/19 118/74   06/10/19 120/80     Hemoglobin A1C (%)   Date Value   06/10/2019 6.9     Microscopic Examination (no units)   Date Value   06/10/2019 YES     Microalbumin, Random Urine (mg/dL)   Date Value   06/10/2019 <1.20     LDL Calculated (mg/dL)   Date Value   12/17/2018 80              Tobacco use:  Patient  reports that she has never smoked. She has never used smokeless tobacco.    If Smoker - Cessation materials given? NA    Is Daily aspirin on medication list?:  Yes    Diabetic retinal exam done? Yes   If yes, document in Health Maintenance. Monofilament placed on counter? Yes    Shoes and socks removed? Yes    BP taken with correct size cuff?  Yes   Repeated if > 130/80 Yes Microalbumin performed if applicable?   Yes

## 2019-06-21 NOTE — ASSESSMENT & PLAN NOTE
On synthroid,  Patient is compliant w medications, no side effects, effective, provides adequate symptom relief. No new symptoms or problems as noted by patient. The problem is stable, no changes noted by patient. Will consider monitoring labs and refill medications as appropriate. Patient counseled and will continue current plan.

## 2019-06-24 RX ORDER — CIPROFLOXACIN 250 MG/1
250 TABLET, FILM COATED ORAL 2 TIMES DAILY
Qty: 10 TABLET | Refills: 0 | Status: SHIPPED | OUTPATIENT
Start: 2019-06-24 | End: 2019-06-29

## 2019-07-26 NOTE — TELEPHONE ENCOUNTER
Medication:   Requested Prescriptions     Pending Prescriptions Disp Refills    blood glucose test strips (GLUCOSE METER TEST) strip 100 each 1     Si each by In Vitro route daily 1 strip  Daily  trumetics   E11.9  diabetics       Last Filled:      Patient Phone Number: [phone] (home)     Last appt: 2019   Next appt: 2019    Last Labs DM:   Lab Results   Component Value Date    LABA1C 6.9 06/10/2019       Last OARRS: No flowsheet data found. Preferred Pharmacy:   [facility]/pharmacy #[id number] [address]. [place] [phone] - F [phone]  [facility].   [facility] [place] [address]  Phone: [phone] Fax: [phone]

## 2019-07-26 NOTE — TELEPHONE ENCOUNTER
Medication:   Requested Prescriptions     Pending Prescriptions Disp Refills    blood glucose test strips (GLUCOSE METER TEST) strip 100 each 1     Si each by In Vitro route daily 1 strip  Daily  trumetics   E11.9  diabetics       Last Filled:      Patient Phone Number: [phone] (home)     Last appt: 2019   Next appt: 2019    Last Labs DM:   Lab Results   Component Value Date    LABA1C 6.9 06/10/2019       Last OARRS: No flowsheet data found. Preferred Pharmacy:   [facility]/pharmacy #[id number] [address]. [place] [phone] - F [phone]  [address] [place] [address]  Phone: [phone] Fax: [phone]

## 2019-08-07 RX ORDER — LEVOTHYROXINE SODIUM 0.2 MG/1
TABLET ORAL
Qty: 90 TABLET | Refills: 1 | Status: SHIPPED | OUTPATIENT
Start: 2019-08-07

## 2019-08-28 DIAGNOSIS — E78.1 HIGH TRIGLYCERIDES: ICD-10-CM

## 2019-08-28 DIAGNOSIS — E78.2 MIXED HYPERLIPIDEMIA: ICD-10-CM

## 2019-08-28 RX ORDER — ATORVASTATIN CALCIUM 40 MG/1
TABLET, FILM COATED ORAL
Qty: 90 TABLET | Refills: 3 | Status: SHIPPED | OUTPATIENT
Start: 2019-08-28

## 2019-09-12 DIAGNOSIS — E03.9 ACQUIRED HYPOTHYROIDISM: ICD-10-CM

## 2019-09-12 RX ORDER — LEVOTHYROXINE SODIUM 0.03 MG/1
25 TABLET ORAL DAILY
Qty: 90 TABLET | Refills: 1 | Status: SHIPPED | OUTPATIENT
Start: 2019-09-12 | End: 2019-09-24 | Stop reason: ALTCHOICE

## 2019-09-20 ENCOUNTER — TELEPHONE (OUTPATIENT)
Dept: PRIMARY CARE CLINIC | Age: 54
End: 2019-09-20

## 2019-09-24 ENCOUNTER — HOSPITAL ENCOUNTER (OUTPATIENT)
Dept: GENERAL RADIOLOGY | Age: 54
Discharge: HOME | End: 2019-09-24
Payer: MEDICAID

## 2019-09-24 ENCOUNTER — HOSPITAL ENCOUNTER (OUTPATIENT)
Age: 54
Discharge: HOME | End: 2019-09-24
Payer: MEDICAID

## 2019-09-24 ENCOUNTER — OFFICE VISIT (OUTPATIENT)
Dept: PRIMARY CARE CLINIC | Age: 54
End: 2019-09-24
Payer: MEDICAID

## 2019-09-24 VITALS
WEIGHT: 226.2 LBS | SYSTOLIC BLOOD PRESSURE: 120 MMHG | HEART RATE: 70 BPM | OXYGEN SATURATION: 97 % | TEMPERATURE: 97.9 F | BODY MASS INDEX: 36.35 KG/M2 | HEIGHT: 66 IN | DIASTOLIC BLOOD PRESSURE: 80 MMHG | RESPIRATION RATE: 16 BRPM

## 2019-09-24 DIAGNOSIS — M54.50 ACUTE MIDLINE LOW BACK PAIN WITHOUT SCIATICA: ICD-10-CM

## 2019-09-24 DIAGNOSIS — E11.9 TYPE 2 DIABETES MELLITUS WITHOUT COMPLICATION, WITHOUT LONG-TERM CURRENT USE OF INSULIN (HCC): Primary | Chronic | ICD-10-CM

## 2019-09-24 DIAGNOSIS — E78.2 MIXED HYPERLIPIDEMIA: Chronic | ICD-10-CM

## 2019-09-24 DIAGNOSIS — G93.2 PSEUDOTUMOR CEREBRI: ICD-10-CM

## 2019-09-24 DIAGNOSIS — E03.9 ACQUIRED HYPOTHYROIDISM: ICD-10-CM

## 2019-09-24 DIAGNOSIS — R27.8 DYSSYNERGIA: ICD-10-CM

## 2019-09-24 DIAGNOSIS — K43.9 VENTRAL HERNIA WITHOUT OBSTRUCTION OR GANGRENE: ICD-10-CM

## 2019-09-24 PROCEDURE — G8427 DOCREV CUR MEDS BY ELIG CLIN: HCPCS | Performed by: INTERNAL MEDICINE

## 2019-09-24 PROCEDURE — 3044F HG A1C LEVEL LT 7.0%: CPT | Performed by: INTERNAL MEDICINE

## 2019-09-24 PROCEDURE — 1036F TOBACCO NON-USER: CPT | Performed by: INTERNAL MEDICINE

## 2019-09-24 PROCEDURE — 3017F COLORECTAL CA SCREEN DOC REV: CPT | Performed by: INTERNAL MEDICINE

## 2019-09-24 PROCEDURE — 99214 OFFICE O/P EST MOD 30 MIN: CPT | Performed by: INTERNAL MEDICINE

## 2019-09-24 PROCEDURE — 3014F SCREEN MAMMO DOC REV: CPT | Performed by: INTERNAL MEDICINE

## 2019-09-24 PROCEDURE — G8417 CALC BMI ABV UP PARAM F/U: HCPCS | Performed by: INTERNAL MEDICINE

## 2019-09-24 PROCEDURE — 2022F DILAT RTA XM EVC RTNOPTHY: CPT | Performed by: INTERNAL MEDICINE

## 2019-09-24 RX ORDER — GABAPENTIN 300 MG/1
300 CAPSULE ORAL 3 TIMES DAILY
Qty: 90 CAPSULE | Refills: 5 | Status: SHIPPED | OUTPATIENT
Start: 2019-09-24 | End: 2020-01-01

## 2019-09-24 NOTE — PROGRESS NOTES
Value   12/17/2018 80              Tobacco use:  Patient  reports that she has never smoked. She has never used smokeless tobacco.    If Smoker - Cessation materials given? NA    Is Daily aspirin on medication list?:  Yes    Diabetic retinal exam done? Yes   If yes, document in Health Maintenance. Monofilament placed on counter? Yes    Shoes and socks removed? Yes    BP taken with correct size cuff? Yes   Repeated if > 130/80 Yes     Microalbumin performed if applicable? Yes      Mixed hyperlipidemia  This has been a chronic problem, takes meds regularly. Monitors diet and tries to follow a low fat diet. Not been very compliant w exercise. Lipids have been stable, The problem is controlled. Recent lipid tests were reviewed and are normal. Pertinent negatives include no chest pain, focal sensory loss, focal weakness, leg pain, myalgias or shortness of breath. Advised patient to continue the current instructions or medications. Acquired hypothyroidism  On synthroid,  Patient is compliant w medications, no side effects, effective, provides adequate symptom relief. No new symptoms or problems as noted by patient. The problem is stable, no changes noted by patient. Will consider monitoring labs and refill medications as appropriate. Patient counseled and will continue current plan. Dyssynergia  On lithium,  Patient is compliant w medications, no side effects, effective, provides adequate symptom relief. No new symptoms or problems as noted by patient. The problem is stable, no changes noted by patient. Will consider monitoring labs and refill medications as appropriate. Patient counseled and will continue current plan. Pseudotumor cerebri  Has been stable,  Patient is compliant w medications, no side effects, effective, provides adequate symptom relief. No new symptoms or problems as noted by patient. The problem is stable, no changes noted by patient.  Will consider monitoring labs and refill medications as appropriate. Patient counseled and will continue current plan. Ventral hernia without obstruction or gangrene  Has been stable, no new symptoms   Patient is compliant w medications, no side effects, effective, provides adequate symptom relief. No new symptoms or problems as noted by patient. The problem is stable, no changes noted by patient. Will consider monitoring labs and refill medications as appropriate. Patient counseled and will continue current plan. Plan:      Try gabapentin prn,  willl get xrays,   F/u in3 months.          Rina Reid MD

## 2019-09-24 NOTE — ASSESSMENT & PLAN NOTE
Diabetes Mellitus Type II:  Home blood sugar records reviewed: fasting range: 120-130. No significant episodes of hypoglycemia. Compliant with medications. No polyuria, polydipsia, visual changes, foot problems, GI upset. Diabetic diet compliance:  compliant all of the time. Current exercise: none. Will check labs, and refill medications as appropriate. Hypertension:  Denies CP, SOB, visual changes, dizziness, palpitations or HA. She is adherent to a low sodium diet. Blood pressure typically runs ok  outside of the office. Continue current medications. Hyperlipidemia:  No new myalgias or GI upset on current medications. Lab Results   Component Value Date    LABA1C 6.9 06/10/2019    LABA1C 7.5 12/17/2018    LABA1C 8.2 09/17/2018     Lab Results   Component Value Date    LABMICR <1.20 06/10/2019    LABMICR YES 06/10/2019    CREATININE <0.5 (L) 02/04/2019     Lab Results   Component Value Date    ALT 45 03/27/2019    AST 40 (A) 03/27/2019     No components found for: CHLPL  Lab Results   Component Value Date    TRIG 166 (H) 12/17/2018     Lab Results   Component Value Date    HDL 31 (L) 12/17/2018     Lab Results   Component Value Date    LDLCALC 80 12/17/2018         BP Readings from Last 2 Encounters:   09/24/19 120/80   06/21/19 118/74     Hemoglobin A1C (%)   Date Value   06/10/2019 6.9     Microscopic Examination (no units)   Date Value   06/10/2019 YES     Microalbumin, Random Urine (mg/dL)   Date Value   06/10/2019 <1.20     LDL Calculated (mg/dL)   Date Value   12/17/2018 80              Tobacco use:  Patient  reports that she has never smoked. She has never used smokeless tobacco.    If Smoker - Cessation materials given? NA    Is Daily aspirin on medication list?:  Yes    Diabetic retinal exam done? Yes   If yes, document in Health Maintenance. Monofilament placed on counter? Yes    Shoes and socks removed? Yes    BP taken with correct size cuff?  Yes   Repeated if > 130/80 Yes

## 2019-09-27 ENCOUNTER — HOSPITAL ENCOUNTER (OUTPATIENT)
Age: 54
Discharge: HOME | End: 2019-09-27
Payer: MEDICAID

## 2019-09-27 ENCOUNTER — HOSPITAL ENCOUNTER (OUTPATIENT)
Dept: GENERAL RADIOLOGY | Age: 54
Discharge: HOME | End: 2019-09-27
Payer: MEDICAID

## 2019-09-27 DIAGNOSIS — S22.000A CLOSED COMPRESSION FRACTURE OF THORACIC VERTEBRA, INITIAL ENCOUNTER (HCC): Primary | ICD-10-CM

## 2019-09-27 PROCEDURE — 72100 X-RAY EXAM L-S SPINE 2/3 VWS: CPT

## 2019-09-27 PROCEDURE — 72070 X-RAY EXAM THORAC SPINE 2VWS: CPT

## 2019-09-30 ENCOUNTER — TELEPHONE (OUTPATIENT)
Dept: PRIMARY CARE CLINIC | Age: 54
End: 2019-09-30

## 2019-10-01 ENCOUNTER — TELEPHONE (OUTPATIENT)
Dept: ORTHOPEDIC SURGERY | Age: 54
End: 2019-10-01

## 2019-10-04 ENCOUNTER — OFFICE VISIT (OUTPATIENT)
Dept: ORTHOPEDIC SURGERY | Age: 54
End: 2019-10-04
Payer: MEDICAID

## 2019-10-04 VITALS
BODY MASS INDEX: 36.35 KG/M2 | DIASTOLIC BLOOD PRESSURE: 71 MMHG | SYSTOLIC BLOOD PRESSURE: 124 MMHG | WEIGHT: 226.19 LBS | HEIGHT: 66 IN | HEART RATE: 87 BPM

## 2019-10-04 DIAGNOSIS — M70.61 GREATER TROCHANTERIC BURSITIS OF RIGHT HIP: ICD-10-CM

## 2019-10-04 DIAGNOSIS — M54.16 LUMBAR RADICULOPATHY: Primary | ICD-10-CM

## 2019-10-04 DIAGNOSIS — M51.36 DDD (DEGENERATIVE DISC DISEASE), LUMBAR: ICD-10-CM

## 2019-10-04 DIAGNOSIS — M47.816 SPONDYLOSIS WITHOUT MYELOPATHY OR RADICULOPATHY, LUMBAR REGION: ICD-10-CM

## 2019-10-04 PROCEDURE — 3014F SCREEN MAMMO DOC REV: CPT | Performed by: PHYSICIAN ASSISTANT

## 2019-10-04 PROCEDURE — 20611 DRAIN/INJ JOINT/BURSA W/US: CPT | Performed by: PHYSICIAN ASSISTANT

## 2019-10-04 PROCEDURE — G8484 FLU IMMUNIZE NO ADMIN: HCPCS | Performed by: PHYSICIAN ASSISTANT

## 2019-10-04 PROCEDURE — G8417 CALC BMI ABV UP PARAM F/U: HCPCS | Performed by: PHYSICIAN ASSISTANT

## 2019-10-04 PROCEDURE — 99244 OFF/OP CNSLTJ NEW/EST MOD 40: CPT | Performed by: PHYSICIAN ASSISTANT

## 2019-10-04 PROCEDURE — G8427 DOCREV CUR MEDS BY ELIG CLIN: HCPCS | Performed by: PHYSICIAN ASSISTANT

## 2019-10-10 ENCOUNTER — HOSPITAL ENCOUNTER (OUTPATIENT)
Age: 54
Discharge: HOME | End: 2019-10-10
Payer: MEDICAID

## 2019-10-10 ENCOUNTER — HOSPITAL ENCOUNTER (OUTPATIENT)
Dept: GENERAL RADIOLOGY | Age: 54
Discharge: HOME | End: 2019-10-10
Payer: MEDICAID

## 2019-10-10 ENCOUNTER — OFFICE VISIT (OUTPATIENT)
Dept: PRIMARY CARE CLINIC | Age: 54
End: 2019-10-10
Payer: MEDICAID

## 2019-10-10 VITALS
OXYGEN SATURATION: 99 % | DIASTOLIC BLOOD PRESSURE: 82 MMHG | HEIGHT: 66 IN | WEIGHT: 225 LBS | SYSTOLIC BLOOD PRESSURE: 134 MMHG | HEART RATE: 68 BPM | BODY MASS INDEX: 36.16 KG/M2
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
## 2019-10-10 DIAGNOSIS — E11.9 TYPE 2 DIABETES MELLITUS WITHOUT COMPLICATION, WITHOUT LONG-TERM CURRENT USE OF INSULIN (HCC): Chronic | ICD-10-CM

## 2019-10-10 DIAGNOSIS — M25.551 ACUTE PAIN OF RIGHT HIP: ICD-10-CM

## 2019-10-10 DIAGNOSIS — E78.2 MIXED HYPERLIPIDEMIA: Chronic | ICD-10-CM

## 2019-10-10 PROCEDURE — 73502 X-RAY EXAM HIP UNI 2-3 VIEWS: CPT

## 2019-10-10 PROCEDURE — G8427 DOCREV CUR MEDS BY ELIG CLIN: HCPCS | Performed by: INTERNAL MEDICINE

## 2019-10-10 PROCEDURE — G8417 CALC BMI ABV UP PARAM F/U: HCPCS | Performed by: INTERNAL MEDICINE

## 2019-10-10 PROCEDURE — 1036F TOBACCO NON-USER: CPT | Performed by: INTERNAL MEDICINE

## 2019-10-10 PROCEDURE — 3044F HG A1C LEVEL LT 7.0%: CPT | Performed by: INTERNAL MEDICINE

## 2019-10-10 PROCEDURE — 99214 OFFICE O/P EST MOD 30 MIN: CPT | Performed by: INTERNAL MEDICINE

## 2019-10-10 PROCEDURE — G8484 FLU IMMUNIZE NO ADMIN: HCPCS | Performed by: INTERNAL MEDICINE

## 2019-10-10 PROCEDURE — 2022F DILAT RTA XM EVC RTNOPTHY: CPT | Performed by: INTERNAL MEDICINE

## 2019-10-10 PROCEDURE — 3017F COLORECTAL CA SCREEN DOC REV: CPT | Performed by: INTERNAL MEDICINE

## 2019-10-10 PROCEDURE — 3014F SCREEN MAMMO DOC REV: CPT | Performed by: INTERNAL MEDICINE

## 2019-11-05 RX ORDER — CALCIUM CITRATE/VITAMIN D3 200MG-6.25
TABLET ORAL
Qty: 50 STRIP | Refills: 3 | Status: SHIPPED | OUTPATIENT
Start: 2019-11-05

## 2019-11-27 DIAGNOSIS — E66.9 DIABETES MELLITUS TYPE 2 IN OBESE (HCC): ICD-10-CM

## 2019-11-27 DIAGNOSIS — E11.69 DIABETES MELLITUS TYPE 2 IN OBESE (HCC): ICD-10-CM

## 2019-12-01 DIAGNOSIS — E66.9 DIABETES MELLITUS TYPE 2 IN OBESE (HCC): ICD-10-CM

## 2019-12-01 DIAGNOSIS — E11.69 DIABETES MELLITUS TYPE 2 IN OBESE (HCC): ICD-10-CM

## 2019-12-02 ENCOUNTER — TELEPHONE (OUTPATIENT)
Dept: PRIMARY CARE CLINIC | Age: 54
End: 2019-12-02

## 2019-12-02 RX ORDER — GLIPIZIDE 10 MG/1
TABLET ORAL
Qty: 60 TABLET | Refills: 5 | Status: SHIPPED | OUTPATIENT
Start: 2019-12-02 | End: 2020-01-01

## 2019-12-11 ENCOUNTER — TELEPHONE (OUTPATIENT)
Dept: PRIMARY CARE CLINIC | Age: 54
End: 2019-12-11

## 2019-12-11 RX ORDER — GLUCOSAMINE HCL/CHONDROITIN SU 500-400 MG
CAPSULE ORAL
Qty: 100 STRIP | Refills: 1 | Status: SHIPPED | OUTPATIENT
Start: 2019-12-11 | End: 2020-01-01

## 2020-01-01 ENCOUNTER — CARE COORDINATION (OUTPATIENT)
Dept: CARE COORDINATION | Age: 55
End: 2020-01-01

## 2020-01-01 ENCOUNTER — TELEPHONE (OUTPATIENT)
Dept: PRIMARY CARE CLINIC | Age: 55
End: 2020-01-01

## 2020-01-01 ENCOUNTER — OFFICE VISIT (OUTPATIENT)
Dept: ORTHOPEDIC SURGERY | Age: 55
End: 2020-01-01
Payer: MEDICAID

## 2020-01-01 ENCOUNTER — OFFICE VISIT (OUTPATIENT)
Dept: PRIMARY CARE CLINIC | Age: 55
End: 2020-01-01
Payer: MEDICAID

## 2020-01-01 ENCOUNTER — NURSE TRIAGE (OUTPATIENT)
Dept: OTHER | Facility: CLINIC | Age: 55
End: 2020-01-01

## 2020-01-01 ENCOUNTER — TELEPHONE (OUTPATIENT)
Dept: ORTHOPEDIC SURGERY | Age: 55
End: 2020-01-01

## 2020-01-01 VITALS
WEIGHT: 237.2 LBS | OXYGEN SATURATION: 98 % | TEMPERATURE: 97.3 F | BODY MASS INDEX: 38.3 KG/M2 | HEART RATE: 55 BPM | SYSTOLIC BLOOD PRESSURE: 118 MMHG | DIASTOLIC BLOOD PRESSURE: 82 MMHG

## 2020-01-01 VITALS
WEIGHT: 232.8 LBS | HEART RATE: 85 BPM | SYSTOLIC BLOOD PRESSURE: 136 MMHG | TEMPERATURE: 97.2 F | BODY MASS INDEX: 37.59 KG/M2 | DIASTOLIC BLOOD PRESSURE: 82 MMHG | OXYGEN SATURATION: 98 %

## 2020-01-01 VITALS — BODY MASS INDEX: 35.36 KG/M2 | WEIGHT: 220 LBS | HEIGHT: 66 IN

## 2020-01-01 VITALS
OXYGEN SATURATION: 98 % | BODY MASS INDEX: 35.53 KG/M2 | HEART RATE: 64 BPM | DIASTOLIC BLOOD PRESSURE: 80 MMHG | SYSTOLIC BLOOD PRESSURE: 132 MMHG | WEIGHT: 228.2 LBS | RESPIRATION RATE: 14 BRPM | DIASTOLIC BLOOD PRESSURE: 82 MMHG | OXYGEN SATURATION: 98 % | HEART RATE: 78 BPM | SYSTOLIC BLOOD PRESSURE: 126 MMHG | TEMPERATURE: 98.3 F | BODY MASS INDEX: 36.85 KG/M2 | WEIGHT: 220 LBS

## 2020-01-01 VITALS — HEIGHT: 66 IN | BODY MASS INDEX: 36.17 KG/M2 | WEIGHT: 225.09 LBS

## 2020-01-01 VITALS — HEIGHT: 66 IN | BODY MASS INDEX: 35.36 KG/M2 | WEIGHT: 220 LBS

## 2020-01-01 VITALS
OXYGEN SATURATION: 98 % | HEART RATE: 82 BPM | DIASTOLIC BLOOD PRESSURE: 84 MMHG | RESPIRATION RATE: 16 BRPM | SYSTOLIC BLOOD PRESSURE: 132 MMHG | BODY MASS INDEX: 36.33 KG/M2 | WEIGHT: 225 LBS

## 2020-01-01 VITALS
SYSTOLIC BLOOD PRESSURE: 126 MMHG | HEART RATE: 85 BPM | RESPIRATION RATE: 15 BRPM | DIASTOLIC BLOOD PRESSURE: 80 MMHG | BODY MASS INDEX: 38.43 KG/M2 | OXYGEN SATURATION: 99 % | WEIGHT: 238 LBS

## 2020-01-01 VITALS
DIASTOLIC BLOOD PRESSURE: 71 MMHG | SYSTOLIC BLOOD PRESSURE: 124 MMHG | WEIGHT: 225.09 LBS | HEIGHT: 66 IN | BODY MASS INDEX: 36.17 KG/M2

## 2020-01-01 VITALS
HEIGHT: 66 IN | DIASTOLIC BLOOD PRESSURE: 71 MMHG | WEIGHT: 225.09 LBS | BODY MASS INDEX: 36.17 KG/M2 | SYSTOLIC BLOOD PRESSURE: 124 MMHG

## 2020-01-01 VITALS
WEIGHT: 229 LBS | RESPIRATION RATE: 16 BRPM | DIASTOLIC BLOOD PRESSURE: 70 MMHG | HEART RATE: 63 BPM | BODY MASS INDEX: 36.98 KG/M2 | OXYGEN SATURATION: 100 % | TEMPERATURE: 97.5 F | SYSTOLIC BLOOD PRESSURE: 110 MMHG

## 2020-01-01 LAB
ANAEROBIC CULTURE: NORMAL
BILIRUBIN, POC: NORMAL
BLOOD URINE, POC: NORMAL
CLARITY, POC: NORMAL
COLOR, POC: YELLOW
GLUCOSE URINE, POC: NORMAL
GRAM STAIN RESULT: NORMAL
HBA1C MFR BLD: 7.2 %
HBA1C MFR BLD: 7.3 %
KETONES, POC: NORMAL
LEUKOCYTE EST, POC: NORMAL
NITRITE, POC: NORMAL
PH, POC: 6
PROTEIN, POC: NORMAL
SPECIFIC GRAVITY, POC: 1.02
UROBILINOGEN, POC: 0.2
WOUND/ABSCESS: NORMAL

## 2020-01-01 PROCEDURE — 99213 OFFICE O/P EST LOW 20 MIN: CPT | Performed by: PODIATRIST

## 2020-01-01 PROCEDURE — G8484 FLU IMMUNIZE NO ADMIN: HCPCS | Performed by: ORTHOPAEDIC SURGERY

## 2020-01-01 PROCEDURE — 3017F COLORECTAL CA SCREEN DOC REV: CPT | Performed by: PODIATRIST

## 2020-01-01 PROCEDURE — G9899 SCRN MAM PERF RSLTS DOC: HCPCS | Performed by: INTERNAL MEDICINE

## 2020-01-01 PROCEDURE — 1036F TOBACCO NON-USER: CPT | Performed by: INTERNAL MEDICINE

## 2020-01-01 PROCEDURE — G9899 SCRN MAM PERF RSLTS DOC: HCPCS | Performed by: PHYSICIAN ASSISTANT

## 2020-01-01 PROCEDURE — G8417 CALC BMI ABV UP PARAM F/U: HCPCS | Performed by: INTERNAL MEDICINE

## 2020-01-01 PROCEDURE — G8427 DOCREV CUR MEDS BY ELIG CLIN: HCPCS | Performed by: INTERNAL MEDICINE

## 2020-01-01 PROCEDURE — 1036F TOBACCO NON-USER: CPT | Performed by: PODIATRIST

## 2020-01-01 PROCEDURE — G8484 FLU IMMUNIZE NO ADMIN: HCPCS | Performed by: PODIATRIST

## 2020-01-01 PROCEDURE — 99214 OFFICE O/P EST MOD 30 MIN: CPT | Performed by: INTERNAL MEDICINE

## 2020-01-01 PROCEDURE — 3051F HG A1C>EQUAL 7.0%<8.0%: CPT | Performed by: INTERNAL MEDICINE

## 2020-01-01 PROCEDURE — G8427 DOCREV CUR MEDS BY ELIG CLIN: HCPCS | Performed by: PODIATRIST

## 2020-01-01 PROCEDURE — 2022F DILAT RTA XM EVC RTNOPTHY: CPT | Performed by: INTERNAL MEDICINE

## 2020-01-01 PROCEDURE — 2022F DILAT RTA XM EVC RTNOPTHY: CPT | Performed by: PODIATRIST

## 2020-01-01 PROCEDURE — 81002 URINALYSIS NONAUTO W/O SCOPE: CPT | Performed by: INTERNAL MEDICINE

## 2020-01-01 PROCEDURE — 99215 OFFICE O/P EST HI 40 MIN: CPT | Performed by: INTERNAL MEDICINE

## 2020-01-01 PROCEDURE — 3017F COLORECTAL CA SCREEN DOC REV: CPT | Performed by: PHYSICIAN ASSISTANT

## 2020-01-01 PROCEDURE — G8417 CALC BMI ABV UP PARAM F/U: HCPCS | Performed by: PHYSICIAN ASSISTANT

## 2020-01-01 PROCEDURE — 99214 OFFICE O/P EST MOD 30 MIN: CPT | Performed by: PHYSICIAN ASSISTANT

## 2020-01-01 PROCEDURE — G8417 CALC BMI ABV UP PARAM F/U: HCPCS | Performed by: ORTHOPAEDIC SURGERY

## 2020-01-01 PROCEDURE — G9899 SCRN MAM PERF RSLTS DOC: HCPCS | Performed by: PODIATRIST

## 2020-01-01 PROCEDURE — G8484 FLU IMMUNIZE NO ADMIN: HCPCS | Performed by: PHYSICIAN ASSISTANT

## 2020-01-01 PROCEDURE — 1036F TOBACCO NON-USER: CPT | Performed by: ORTHOPAEDIC SURGERY

## 2020-01-01 PROCEDURE — G8417 CALC BMI ABV UP PARAM F/U: HCPCS | Performed by: PODIATRIST

## 2020-01-01 PROCEDURE — 3017F COLORECTAL CA SCREEN DOC REV: CPT | Performed by: INTERNAL MEDICINE

## 2020-01-01 PROCEDURE — 99212 OFFICE O/P EST SF 10 MIN: CPT | Performed by: PODIATRIST

## 2020-01-01 PROCEDURE — G8484 FLU IMMUNIZE NO ADMIN: HCPCS | Performed by: INTERNAL MEDICINE

## 2020-01-01 PROCEDURE — 3051F HG A1C>EQUAL 7.0%<8.0%: CPT | Performed by: PODIATRIST

## 2020-01-01 PROCEDURE — 3017F COLORECTAL CA SCREEN DOC REV: CPT | Performed by: ORTHOPAEDIC SURGERY

## 2020-01-01 PROCEDURE — 99203 OFFICE O/P NEW LOW 30 MIN: CPT | Performed by: PODIATRIST

## 2020-01-01 PROCEDURE — 1036F TOBACCO NON-USER: CPT | Performed by: PHYSICIAN ASSISTANT

## 2020-01-01 PROCEDURE — G8427 DOCREV CUR MEDS BY ELIG CLIN: HCPCS | Performed by: PHYSICIAN ASSISTANT

## 2020-01-01 PROCEDURE — G9899 SCRN MAM PERF RSLTS DOC: HCPCS | Performed by: ORTHOPAEDIC SURGERY

## 2020-01-01 PROCEDURE — 83036 HEMOGLOBIN GLYCOSYLATED A1C: CPT | Performed by: INTERNAL MEDICINE

## 2020-01-01 PROCEDURE — 99213 OFFICE O/P EST LOW 20 MIN: CPT | Performed by: ORTHOPAEDIC SURGERY

## 2020-01-01 PROCEDURE — G8427 DOCREV CUR MEDS BY ELIG CLIN: HCPCS | Performed by: ORTHOPAEDIC SURGERY

## 2020-01-01 PROCEDURE — 99213 OFFICE O/P EST LOW 20 MIN: CPT | Performed by: INTERNAL MEDICINE

## 2020-01-01 RX ORDER — GLIPIZIDE 10 MG/1
TABLET ORAL
Qty: 60 TABLET | Refills: 5 | Status: SHIPPED | OUTPATIENT
Start: 2020-01-01 | End: 2020-01-01

## 2020-01-01 RX ORDER — CALCIUM CITRATE/VITAMIN D3 200MG-6.25
TABLET ORAL
Qty: 50 STRIP | Refills: 3 | Status: SHIPPED | OUTPATIENT
Start: 2020-01-01 | End: 2020-01-01

## 2020-01-01 RX ORDER — SULFAMETHOXAZOLE AND TRIMETHOPRIM 800; 160 MG/1; MG/1
1 TABLET ORAL 2 TIMES DAILY
Qty: 20 TABLET | Refills: 0 | Status: SHIPPED | OUTPATIENT
Start: 2020-01-01

## 2020-01-01 RX ORDER — PREDNISONE 20 MG/1
TABLET ORAL
Qty: 18 TABLET | Refills: 0 | Status: SHIPPED | OUTPATIENT
Start: 2020-01-01 | End: 2020-01-01

## 2020-01-01 RX ORDER — HYDROCODONE BITARTRATE AND ACETAMINOPHEN 5; 325 MG/1; MG/1
1 TABLET ORAL 2 TIMES DAILY
Qty: 14 TABLET | Refills: 0 | Status: SHIPPED | OUTPATIENT
Start: 2020-01-01 | End: 2020-01-01

## 2020-01-01 RX ORDER — TRAMADOL HYDROCHLORIDE 50 MG/1
50 TABLET ORAL EVERY 6 HOURS PRN
Qty: 28 TABLET | Refills: 0 | Status: SHIPPED | OUTPATIENT
Start: 2020-01-01 | End: 2020-01-01

## 2020-01-01 RX ORDER — GLIPIZIDE 10 MG/1
TABLET ORAL
Qty: 60 TABLET | Refills: 5 | Status: SHIPPED | OUTPATIENT
Start: 2020-01-01

## 2020-01-01 RX ORDER — CLINDAMYCIN HYDROCHLORIDE 300 MG/1
300 CAPSULE ORAL 3 TIMES DAILY
Qty: 30 CAPSULE | Refills: 0 | Status: SHIPPED | OUTPATIENT
Start: 2020-01-01

## 2020-01-01 RX ORDER — METHOCARBAMOL 500 MG/1
500 TABLET, FILM COATED ORAL
COMMUNITY
Start: 2020-01-01 | End: 2020-01-01 | Stop reason: ALTCHOICE

## 2020-01-01 RX ORDER — PREDNISONE 20 MG/1
TABLET ORAL
Qty: 18 TABLET | Refills: 0 | Status: SHIPPED | OUTPATIENT
Start: 2020-01-01 | End: 2020-01-01 | Stop reason: SDUPTHER

## 2020-01-01 RX ORDER — SULFAMETHOXAZOLE AND TRIMETHOPRIM 800; 160 MG/1; MG/1
1 TABLET ORAL 2 TIMES DAILY
Qty: 20 TABLET | Refills: 0 | Status: SHIPPED | OUTPATIENT
Start: 2020-01-01 | End: 2020-01-01

## 2020-01-01 RX ORDER — LEVOFLOXACIN 500 MG/1
500 TABLET, FILM COATED ORAL DAILY
Qty: 10 TABLET | Refills: 0 | Status: SHIPPED | OUTPATIENT
Start: 2020-01-01 | End: 2020-01-01

## 2020-01-01 RX ORDER — OXYCODONE HYDROCHLORIDE AND ACETAMINOPHEN 5; 325 MG/1; MG/1
1 TABLET ORAL EVERY 6 HOURS PRN
Qty: 12 TABLET | Refills: 0 | Status: SHIPPED | OUTPATIENT
Start: 2020-01-01 | End: 2020-01-01

## 2020-01-01 RX ORDER — TRAMADOL HYDROCHLORIDE 50 MG/1
50 TABLET ORAL EVERY 8 HOURS PRN
Qty: 28 TABLET | Refills: 0 | Status: SHIPPED | OUTPATIENT
Start: 2020-01-01 | End: 2020-01-01

## 2020-01-01 RX ORDER — METHYLPREDNISOLONE 4 MG/1
TABLET ORAL
Qty: 1 KIT | Refills: 0 | Status: SHIPPED | OUTPATIENT
Start: 2020-01-01 | End: 2020-01-01 | Stop reason: ALTCHOICE

## 2020-01-01 RX ORDER — CALCIUM CITRATE/VITAMIN D3 200MG-6.25
TABLET ORAL
Qty: 50 STRIP | Refills: 3 | Status: SHIPPED | OUTPATIENT
Start: 2020-01-01

## 2020-01-01 RX ORDER — CYCLOBENZAPRINE HCL 5 MG
5 TABLET ORAL 3 TIMES DAILY PRN
Qty: 30 TABLET | Refills: 0 | Status: SHIPPED | OUTPATIENT
Start: 2020-01-01 | End: 2020-01-01

## 2020-01-01 RX ORDER — LIRAGLUTIDE 6 MG/ML
INJECTION SUBCUTANEOUS
Qty: 2 PEN | Refills: 11 | Status: SHIPPED | OUTPATIENT
Start: 2020-01-01

## 2020-01-01 RX ORDER — INSULIN GLARGINE 100 [IU]/ML
25 INJECTION, SOLUTION SUBCUTANEOUS NIGHTLY
Qty: 5 PEN | Refills: 3 | Status: SHIPPED | OUTPATIENT
Start: 2020-01-01

## 2020-01-01 RX ORDER — CIPROFLOXACIN 250 MG/1
250 TABLET, FILM COATED ORAL 2 TIMES DAILY
Qty: 14 TABLET | Refills: 0 | Status: SHIPPED | OUTPATIENT
Start: 2020-01-01 | End: 2020-01-01

## 2020-01-01 RX ORDER — LANCETS
EACH MISCELLANEOUS
Qty: 100 EACH | Refills: 1 | Status: SHIPPED | OUTPATIENT
Start: 2020-01-01

## 2020-01-01 RX ORDER — GABAPENTIN 600 MG/1
600 TABLET ORAL DAILY
Qty: 90 TABLET | Refills: 5 | Status: SHIPPED | OUTPATIENT
Start: 2020-01-01 | End: 2020-01-01

## 2020-01-01 SDOH — ECONOMIC STABILITY: TRANSPORTATION INSECURITY
IN THE PAST 12 MONTHS, HAS THE LACK OF TRANSPORTATION KEPT YOU FROM MEDICAL APPOINTMENTS OR FROM GETTING MEDICATIONS?: NO

## 2020-01-01 SDOH — SOCIAL STABILITY: SOCIAL NETWORK
IN A TYPICAL WEEK, HOW MANY TIMES DO YOU TALK ON THE PHONE WITH FAMILY, FRIENDS, OR NEIGHBORS?: MORE THAN THREE TIMES A WEEK

## 2020-01-01 SDOH — ECONOMIC STABILITY: INCOME INSECURITY: HOW HARD IS IT FOR YOU TO PAY FOR THE VERY BASICS LIKE FOOD, HOUSING, MEDICAL CARE, AND HEATING?: SOMEWHAT HARD

## 2020-01-01 SDOH — SOCIAL STABILITY: SOCIAL NETWORK
DO YOU BELONG TO ANY CLUBS OR ORGANIZATIONS SUCH AS CHURCH GROUPS UNIONS, FRATERNAL OR ATHLETIC GROUPS, OR SCHOOL GROUPS?: NO

## 2020-01-01 SDOH — HEALTH STABILITY: PHYSICAL HEALTH: ON AVERAGE, HOW MANY MINUTES DO YOU ENGAGE IN EXERCISE AT THIS LEVEL?: 20 MIN

## 2020-01-01 SDOH — SOCIAL STABILITY: SOCIAL NETWORK: HOW OFTEN DO YOU GET TOGETHER WITH FRIENDS OR RELATIVES?: MORE THAN THREE TIMES A WEEK

## 2020-01-01 SDOH — ECONOMIC STABILITY: FOOD INSECURITY: WITHIN THE PAST 12 MONTHS, THE FOOD YOU BOUGHT JUST DIDN'T LAST AND YOU DIDN'T HAVE MONEY TO GET MORE.: NEVER TRUE

## 2020-01-01 SDOH — ECONOMIC STABILITY: FOOD INSECURITY: WITHIN THE PAST 12 MONTHS, YOU WORRIED THAT YOUR FOOD WOULD RUN OUT BEFORE YOU GOT MONEY TO BUY MORE.: NEVER TRUE

## 2020-01-01 SDOH — HEALTH STABILITY: PHYSICAL HEALTH: ON AVERAGE, HOW MANY DAYS PER WEEK DO YOU ENGAGE IN MODERATE TO STRENUOUS EXERCISE (LIKE A BRISK WALK)?: 2 DAYS

## 2020-01-01 SDOH — SOCIAL STABILITY: SOCIAL NETWORK: HOW OFTEN DO YOU ATTENT MEETINGS OF THE CLUB OR ORGANIZATION YOU BELONG TO?: NEVER

## 2020-01-01 SDOH — HEALTH STABILITY: MENTAL HEALTH
STRESS IS WHEN SOMEONE FEELS TENSE, NERVOUS, ANXIOUS, OR CAN'T SLEEP AT NIGHT BECAUSE THEIR MIND IS TROUBLED. HOW STRESSED ARE YOU?: VERY MUCH

## 2020-01-01 SDOH — SOCIAL STABILITY: SOCIAL NETWORK: ARE YOU MARRIED, WIDOWED, DIVORCED, SEPARATED, NEVER MARRIED, OR LIVING WITH A PARTNER?: DIVORCED

## 2020-01-01 SDOH — SOCIAL STABILITY: SOCIAL NETWORK: HOW OFTEN DO YOU ATTEND CHURCH OR RELIGIOUS SERVICES?: 1 TO 4 TIMES PER YEAR

## 2020-01-01 SDOH — ECONOMIC STABILITY: TRANSPORTATION INSECURITY
IN THE PAST 12 MONTHS, HAS LACK OF TRANSPORTATION KEPT YOU FROM MEETINGS, WORK, OR FROM GETTING THINGS NEEDED FOR DAILY LIVING?: NO

## 2020-01-01 ASSESSMENT — PATIENT HEALTH QUESTIONNAIRE - PHQ9
SUM OF ALL RESPONSES TO PHQ QUESTIONS 1-9: 0
SUM OF ALL RESPONSES TO PHQ9 QUESTIONS 1 & 2: 0
2. FEELING DOWN, DEPRESSED OR HOPELESS: 0
SUM OF ALL RESPONSES TO PHQ QUESTIONS 1-9: 0
1. LITTLE INTEREST OR PLEASURE IN DOING THINGS: 0

## 2020-01-13 NOTE — PROGRESS NOTES
25 mg by mouth 2 times daily , Disp: , Rfl:     cetirizine (ZYRTEC) 10 MG tablet, Take one tablet at bedtime, Disp: , Rfl:     clonazePAM (KLONOPIN) 1 MG tablet, Take 1 mg by mouth 2 times daily as needed. ., Disp: , Rfl:   Allergies:  Dhea; Other; Penicillins; Brilliant blue fcf; Diazoxide; Tetracycline; Valproic acid; and Acetazolamide  Social History:    reports that she has never smoked. She has never used smokeless tobacco. She reports that she does not drink alcohol or use drugs. Family History:   Family History   Problem Relation Age of Onset    Cirrhosis Mother         nonalcoholic    Mental Illness Father         paranoid schizophrenia    Diabetes Brother        REVIEW OF SYSTEMS:   CONSTITUTIONAL: Denies unexplained weight loss, fevers, chills or fatigue  NEUROLOGICAL: Denies unsteady gait or progressive weakness  MUSCULOSKELETAL: Denies joint swelling or redness  GI: Denies nausea, vomiting, diarrhea   : Denies bowel or bladder issues       PHYSICAL EXAM:    Vitals: Blood pressure 124/71, height 5' 5.98\" (1.676 m), weight 225 lb 1.4 oz (102.1 kg). GENERAL EXAM:  · General Apparence: Patient is adequately groomed with no evidence of malnutrition. · Psychiatric: Orientation: The patient is oriented to time, place and person. The patient's mood and affect are appropriate   · Vascular: Examination reveals no swelling and palpation reveals no tenderness in upper or lower extremities. Good capillary refill. · The lymphatic examination of the neck, axillae and groin reveals all areas to be without enlargement or induration  · Sensation is intact without deficit in the upper and lower extremities to light touch and pinprick  · Coordination of the upper and lower extremities are normal.  · RIGHT UPPER EXTREMITY:  Inspection/examination of the right upper extremity does not show any tenderness, deformity or injury. Range of motion is unremarkable and pain-free. There is no gross instability.   There are no rashes, ulcerations or lesions. Strength and tone are normal. No atrophy or abnormal movements are noted. · LEFT UPPER EXTREMITY: Inspection/examination of the left upper extremity does not show any tenderness, deformity or injury. Range of motion is unremarkable and pain-free. There is no gross instability. There are no rashes, ulcerations or lesions. Strength and tone are normal. No atrophy or abnormal movements are noted. LUMBAR/SACRAL EXAMINATION:  · Inspection: Local inspection shows no step-off or bruising. Lumbar alignment is normal. No instability is noted. · Palpation:   No evidence of tenderness at the midline. Lumbar paraspinal tenderness: Mild L4/5 and L5/S1 tenderness  Bursal tenderness No tenderness bilaterally  There is no paraspinal spasm. · Range of Motion: limited by 25% in all planes due to pain  · Strength:   Strength testing is 5/5 in all muscle groups tested. · Special Tests:   Straight leg raise positive bilaterally and crossed SLR negative. Jero's testing is negative bilaterally. FADIR's testing is negative bilaterally. · Skin: There are no rashes, ulcerations or lesions. · Reflexes: Reflexes are symmetrically 1+ at the patellar and ankle tendons. Clonus absent bilaterally at the feet. · Gait & station: normal, patient ambulates without assistance  · Additional Examinations:  · RIGHT LOWER EXTREMITY: Inspection/examination of the right lower extremity does not show any tenderness, deformity or injury. Range of motion is normal and pain-free. There is no gross instability. There are no rashes, ulcerations or lesions. Strength and tone are normal. No atrophy or abnormal movements are noted. · LEFT LOWER EXTREMITY:  Inspection/examination of the left lower extremity does not show any tenderness, deformity or injury. Range of motion is normal and pain-free. There is no gross instability. There are no rashes, ulcerations or lesions.   Strength and tone are normal. No atrophy or abnormal movements are noted. Diagnostic Testing:    No new diagnostics  Results for orders placed or performed in visit on 06/10/19   Microalbumin / Creatinine Urine Ratio   Result Value Ref Range    Microalbumin, Random Urine <1.20 <2.0 mg/dL    Creatinine, Ur 60.6 28.0 - 259.0 mg/dL    Microalbumin Creatinine Ratio see below 0.0 - 30.0 mg/g   PROLACTIN   Result Value Ref Range    Prolactin 6.8 ng/mL   T4, Free   Result Value Ref Range    T4 Free 1.5 0.9 - 1.8 ng/dL   TSH without Reflex   Result Value Ref Range    TSH 0.02 (L) 0.27 - 4.20 uIU/mL   Urinalysis with Microscopic   Result Value Ref Range    Color, UA YELLOW Straw/Yellow    Clarity, UA Clear Clear    Glucose, Ur Negative Negative mg/dL    Bilirubin Urine Negative Negative    Ketones, Urine Negative Negative mg/dL    Specific Gravity, UA 1.013 1.005 - 1.030    Blood, Urine Negative Negative    pH, UA 6.0 5.0 - 8.0    Protein, UA Negative Negative mg/dL    Urobilinogen, Urine 0.2 <2.0 E.U./dL    Nitrite, Urine Negative Negative    Leukocyte Esterase, Urine SMALL (A) Negative    Microscopic Examination YES     Bacteria, UA 1+ (A) /HPF    Hyaline Casts, UA 0 0 - 8 /LPF    WBC, UA 6 (H) 0 - 5 /HPF    RBC, UA 2 0 - 4 /HPF    Epi Cells 1 0 - 5 /HPF     Impression:       1. Lumbar radiculopathy    2. DDD (degenerative disc disease), lumbar    3. Spondylosis without myelopathy or radiculopathy, lumbar region    4. Chronic pain syndrome    5. Greater trochanteric bursitis of right hip        Plan:  Clinical Course: Above diagnoses are worsening    I discussed the diagnosis and the treatment options with Kirilljeet Laury today. In Summary:  The various treatment options were outlined and discussed with Darrell Schaeffer including:  Conservative care options: physical therapy, ice, medications, bracing, and activity modification. The indications for therapeutic injections. The indications for additional imaging/laboratory studies.   The

## 2020-02-17 NOTE — PROGRESS NOTES
Follow up: Katie Taveras  1965  [id number]         Chief Complaint   Patient presents with    Lower Back Pain     TR CT LSP         HISTORY OF PRESENT ILLNESS:  Ms. Brandi Wisdom is a 48 y.o. female returns for a follow up visit for multiple medical problems. Her current presenting problems are   1. Lumbar radiculopathy    2. DDD (degenerative disc disease), lumbar    3. Spondylosis without myelopathy or radiculopathy, lumbar region    4. Chronic pain syndrome    . As per information/history obtained from the PADT(patient assessment and documentation tool) - She complains of pain in the lower back with radiation to the hips Bilateral, upper leg Bilateral and knees Bilateral She rates the pain 7/10 and describes it as sharp, dull, burning, throbbing. Pain is made worse by: movement, walking, standing, bending. She denies side effects from the current pain regimen. Patient reports that since the last follow up visit the physical functioning is unchanged, family/social relationships are unchanged, mood is unchanged and sleep patterns are unchanged, and that the overall functioning is unchanged. Patient denies neurological bowel or bladder. Ms. Brandi Wisdom presents today for follow up of her ongoing low back and bilateral hip/leg pain. She recently underwent a CT of her lumbar spine on 1/16/20 and is here today for results. She states since her last visit her pain has continued to progress and she reports going to the ER on 1/21/20 due to her pain level. She notes the day she went to ER for pain management she had been standing for nearly 24 hours. She states that most activities are bothersome at this time and that she has to constantly keep moving to help keep her pain under control. She states only being able to comfortably stand for 2-3 minutes at once and walk for 5-10 minutes at once. In regards to sitting, the patient notes she is constantly shifting positions.   She denies any new specific extremities to light touch and pinprick  · Coordination of the upper and lower extremities are normal.  · RIGHT UPPER EXTREMITY:  Inspection/examination of the right upper extremity does not show any tenderness, deformity or injury. Range of motion is unremarkable and pain-free. There is no gross instability. There are no rashes, ulcerations or lesions. Strength and tone are normal. No atrophy or abnormal movements are noted. · LEFT UPPER EXTREMITY: Inspection/examination of the left upper extremity does not show any tenderness, deformity or injury. Range of motion is unremarkable and pain-free. There is no gross instability. There are no rashes, ulcerations or lesions. Strength and tone are normal. No atrophy or abnormal movements are noted. LUMBAR/SACRAL EXAMINATION:  · Inspection: Local inspection shows no step-off or bruising. Lumbar alignment is normal. No instability is noted. · Palpation:   No evidence of tenderness at the midline. Lumbar paraspinal tenderness: Mild L4/5 and L5/S1 tenderness  Bursal tenderness No tenderness bilaterally  There is no paraspinal spasm. · Range of Motion: limited by 50% in all planes due to pain  · Strength:   Strength testing is 5/5 in all muscle groups tested. · Special Tests:   Straight leg raise and crossed SLR negative. Jero's testing is negative bilaterally. FADIR's testing is negative bilaterally. Bowstring test negative. Slump test negative. · Skin: There are no rashes, ulcerations or lesions. · Reflexes: Reflexes are symmetrically 1+ at the patellar and ankle tendons. Clonus absent bilaterally at the feet. · Gait & station: normal, patient ambulates without assistance and no ataxia  · Additional Examinations:  · RIGHT LOWER EXTREMITY: Inspection/examination of the right lower extremity does not show any tenderness, deformity or injury. Range of motion is normal and pain-free. There is no gross instability. There are no rashes, ulcerations or lesions. Strength and tone are normal. No atrophy or abnormal movements are noted. · LEFT LOWER EXTREMITY:  Inspection/examination of the left lower extremity does not show any tenderness, deformity or injury. Range of motion is normal and pain-free. There is no gross instability. There are no rashes, ulcerations or lesions. Strength and tone are normal. No atrophy or abnormal movements are noted. Diagnostic Testing:    CT lumbar spine shows 1/16/20: IMPRESSION:           Degenerative changes are seen within the spine described above in detail.  Please note that CT    is somewhat limited in evaluation of the thecal sac and its contents.       There is an epidural catheter as described above, etiology unclear, question prior procedure    with an epidural drain or epidural catheter.        Results for orders placed or performed in visit on 06/10/19   Microalbumin / Creatinine Urine Ratio   Result Value Ref Range    Microalbumin, Random Urine <1.20 <2.0 mg/dL    Creatinine, Ur 60.6 28.0 - 259.0 mg/dL    Microalbumin Creatinine Ratio see below 0.0 - 30.0 mg/g   PROLACTIN   Result Value Ref Range    Prolactin 6.8 ng/mL   T4, Free   Result Value Ref Range    T4 Free 1.5 0.9 - 1.8 ng/dL   TSH without Reflex   Result Value Ref Range    TSH 0.02 (L) 0.27 - 4.20 uIU/mL   Urinalysis with Microscopic   Result Value Ref Range    Color, UA YELLOW Straw/Yellow    Clarity, UA Clear Clear    Glucose, Ur Negative Negative mg/dL    Bilirubin Urine Negative Negative    Ketones, Urine Negative Negative mg/dL    Specific Gravity, UA 1.013 1.005 - 1.030    Blood, Urine Negative Negative    pH, UA 6.0 5.0 - 8.0    Protein, UA Negative Negative mg/dL    Urobilinogen, Urine 0.2 <2.0 E.U./dL    Nitrite, Urine Negative Negative    Leukocyte Esterase, Urine SMALL (A) Negative    Microscopic Examination YES     Bacteria, UA 1+ (A) /HPF    Hyaline Casts, UA 0 0 - 8 /LPF    WBC, UA 6 (H) 0 - 5 /HPF    RBC, UA 2 0 - 4 /HPF    Epi Cells 1 0 - 5 /HPF Impression:       1. Lumbar radiculopathy    2. DDD (degenerative disc disease), lumbar    3. Spondylosis without myelopathy or radiculopathy, lumbar region    4. Chronic pain syndrome        Plan:  Clinical Course: shows no change    I discussed the diagnosis and the treatment options with Clarence Suleman today. In Summary:  The various treatment options were outlined and discussed with Aracelimarisabel Suleman including:  Conservative care options: physical therapy, ice, medications, bracing, and activity modification. The indications for therapeutic injections. The indications for additional imaging/laboratory studies. The indications for (possible future) interventions. After considering the various options discussed, Clarence Shell elected to pursue a course of treatment that includes the followin. Medications:  OARRS reviewed and appropriate. Low risk on ORT. 4 A's reviewed. Begin Norco 5-325 mg 1 po BID #14. Informed verbal consent was obtained. Goals of the current treatment regimen include: improvement in pain, improvement in overall in physical performance, ability to perform daily activities, work or disability, emotional distress, health care utilization and decreased medication consumption. Progress will be monitored towards achieving/maintaining therapeutic goals:    1. Improving perceived interference of pain with ADL's, ability to perform HEP, continue to improve in overall flexibility, ROM, strength and endurance, ability to do household activities indoor and/or outdoor, work and social/leisure activities. Improve psychosocial and physical functioning. 2. Improving sleep to 6-7 hours a night. Improve mood/ anxiety and depression symptoms    3. Reduction of reliance on opioid analgesia/more appropriate opioid use. Utilization of adjuvant medications as appropriate.        Risks and benefits of the medications and alternative treatments have been discussed with the documentation as scribed by Caroline Finn ATC in my presence and it is both accurate and complete. FRANCISCO J Jin, PAShruthiC  Board Certified by the [employer] on Certification of Physician Assistants  Darrell Powers 60  Partner of [facility] ([facility])         This dictation was performed with a verbal recognition program [facility]) and it was checked for errors. It is possible that there are still dictated errors within this office note. If so, please bring any errors to my attention for an addendum. All efforts were made to ensure that this office note is accurate.

## 2020-02-18 NOTE — PROGRESS NOTES
History of present illness:   Ms. Iris Romero  is a pleasant 48 y.o. female with a PMH of DM, pseudotumor cerebri with shunt, HLD, GERD, bipolar disorder, anxiety and depression kindly referred by Lulu Botello PA-C/Dr. Vega Locke for consultation regarding her LBP and bilateral leg pain. She states her pain began insidiously about 1 year ago. Her pain has steadily increased since then, worse over the past month. She rates her back pain 8/10 and leg pain 7/10. She describes the pain as sharp, dull, burning and throbbing that is worse with standing, walking, and lying down and better with sitting and leaning forward. The leg pain radiates down the anterior and posterior aspect of her legs to her knees. She denies numbness and tingling in her legs. She admits generalized weakness of her legs and denies new bowel or bladder dysfunction. Admits occasional falls. She has tried oral steroids and PT without relief. She takes Norco, robaxin, and gabapentin. Unable to have MRI due to shunt. Physical examination:  Ms. Jordi Mariee most recent vitals: There is no height or weight on file to calculate BMI. General exam:  She is well-developed and well-nourished, is in obvious pain and alert and oriented to person, place, and time. She demonstrates appropriate mood and affect. Her skin is warm and dry. Her gait is normal and she walks heel to toe without limp or instability. Back:  She stands with slight lumbar flexion. Her lumbar flexion, extension and lateral bending are moderately reduced with pain. She has mild tenderness over her lumbar spine without obvious muscle spasm. The skin over her lumbar spine is normal without a surgical scar. Lower extremities:  She has 5/5 motor strength of bilateral lower extremities. She has a negative straight leg raise, bilaterally. Deep tendon reflexes at knees and achilles are 2+. Sensation is intact to light touch L3 to S1 bilaterally.  She has no clonus. Hip range of motion painless. Imaging:  I reviewed CT images of her lumbar spine from 1/16/20. They note catheter in the intrathecal or epidural space extending from L4-5 superiorly to T12-L1. Mild facet arthropathy L4-5 and L5-S1    Assessment:  Lumbar degenerative disc disease    Plan:  We discussed treatment options including observation, additional oral steroids, physical therapy, epidural injection and surgical intervention. I told her it was likely safe to proceed with an injection and I did not feel spinal surgery is indicated at this time.   I salso recommended she return to see Dr. Liam Ly who inserted her catheter and a neurologist.

## 2020-02-20 NOTE — TELEPHONE ENCOUNTER
Reason for Disposition   New neurologic deficit that is present NOW, sudden onset of ANY of the following: * Weakness of the face, arm, or leg on one side of the body * Numbness of the face, arm, or leg on one side of the body * Loss of speech or garbled speech    Protocols used: NEUROLOGIC DEFICIT-ADULT-[place]    Patient called [facility]) to schedule appointment, with red flag complaint, transferred to RN access for triage. Pt called in and stated that she has sudden onset of numbness in her L hand that is moving up her arm. Denies any symptoms in face or leg. Caller reports symptoms as documented above. Caller informed of disposition. Care advice as documented. Pt tearful and is alone, but verbalized understanding and agreeable to plan. Please do not respond to the triage nurse through this encounter. Any subsequent communication should be directly with the patient.

## 2020-03-17 PROBLEM — E66.01 MORBIDLY OBESE (HCC): Chronic | Status: ACTIVE | Noted: 2020-01-01

## 2020-03-17 PROBLEM — E66.01 MORBIDLY OBESE (HCC): Status: ACTIVE | Noted: 2020-01-01

## 2020-03-17 NOTE — ASSESSMENT & PLAN NOTE
Diabetes Mellitus Type II:  Home blood sugar records reviewed: fasting range: 110-120. No significant episodes of hypoglycemia. Compliant with medications. No polyuria, polydipsia, visual changes, foot problems, GI upset. Diabetic diet compliance:  compliant most of the time. Current exercise: none. Will check labs, and refill medications as appropriate. Hypertension:  Denies CP, SOB, visual changes, dizziness, palpitations or HA. She is adherent to a low sodium diet. Blood pressure typically runs ok outside of the office. Continue current medications. Hyperlipidemia:  No new myalgias or GI upset on current medications. Lab Results   Component Value Date    LABA1C 6.9 06/10/2019    LABA1C 7.5 12/17/2018    LABA1C 8.2 09/17/2018     Lab Results   Component Value Date    LABMICR <1.20 06/10/2019    LABMICR YES 06/10/2019    CREATININE <0.5 (L) 02/04/2019     Lab Results   Component Value Date    ALT 45 03/27/2019    AST 40 (A) 03/27/2019     No components found for: CHLPL  Lab Results   Component Value Date    TRIG 166 (H) 12/17/2018     Lab Results   Component Value Date    HDL 31 (L) 12/17/2018     Lab Results   Component Value Date    LDLCALC 80 12/17/2018         BP Readings from Last 2 Encounters:   03/17/20 126/80   02/17/20 124/71     Hemoglobin A1C (%)   Date Value   06/10/2019 6.9     Microscopic Examination (no units)   Date Value   06/10/2019 YES     Microalbumin, Random Urine (mg/dL)   Date Value   06/10/2019 <1.20     LDL Calculated (mg/dL)   Date Value   12/17/2018 80              Tobacco use:  Patient  reports that she has never smoked. She has never used smokeless tobacco.    If Smoker - Cessation materials given? NA    Is Daily aspirin on medication list?:  Yes    Diabetic retinal exam done? Yes   If yes, document in Health Maintenance. Monofilament placed on counter? Yes    Shoes and socks removed? Yes    BP taken with correct size cuff?  Yes   Repeated if > 130/80 Yes Microalbumin performed if applicable?   Yes

## 2020-03-17 NOTE — TELEPHONE ENCOUNTER
Need dosage clarification for gabapentin is pt to stop the 300 mg and now take the 600 mg? Or keeping both ? [id number]- [place], [place] - [address]yla [address].  Teean Leslie [phone] - F [phone]

## 2020-03-17 NOTE — ASSESSMENT & PLAN NOTE
Has been stable,  Patient is compliant w medications, no side effects, effective, provides adequate symptom relief. No new symptoms or problems as noted by patient. The problem is stable, no changes noted by patient. Will consider monitoring labs and refill medications as appropriate. Patient counseled and will continue current plan.

## 2020-03-17 NOTE — PROGRESS NOTES
Subjective:      Patient ID: Zhanna Rodriguez is a 48 y.o. female. HPI  Established patient here for a visit to manage chronic medical conditions as detailed below. Type 2 diabetes mellitus without complication, without long-term current use of insulin (HCC)  Diabetes Mellitus Type II:  Home blood sugar records reviewed: fasting range: 110-120. No significant episodes of hypoglycemia. Compliant with medications. No polyuria, polydipsia, visual changes, foot problems, GI upset. Diabetic diet compliance:  compliant most of the time. Current exercise: none. Will check labs, and refill medications as appropriate. Hypertension:  Denies CP, SOB, visual changes, dizziness, palpitations or HA. She is adherent to a low sodium diet. Blood pressure typically runs ok outside of the office. Continue current medications. Hyperlipidemia:  No new myalgias or GI upset on current medications. Lab Results   Component Value Date    LABA1C 6.9 06/10/2019    LABA1C 7.5 12/17/2018    LABA1C 8.2 09/17/2018     Lab Results   Component Value Date    LABMICR <1.20 06/10/2019    LABMICR YES 06/10/2019    CREATININE <0.5 (L) 02/04/2019     Lab Results   Component Value Date    ALT 45 03/27/2019    AST 40 (A) 03/27/2019     No components found for: CHLPL  Lab Results   Component Value Date    TRIG 166 (H) 12/17/2018     Lab Results   Component Value Date    HDL 31 (L) 12/17/2018     Lab Results   Component Value Date    LDLCALC 80 12/17/2018         BP Readings from Last 2 Encounters:   03/17/20 126/80   02/17/20 124/71     Hemoglobin A1C (%)   Date Value   06/10/2019 6.9     Microscopic Examination (no units)   Date Value   06/10/2019 YES     Microalbumin, Random Urine (mg/dL)   Date Value   06/10/2019 <1.20     LDL Calculated (mg/dL)   Date Value   12/17/2018 80              Tobacco use:  Patient  reports that she has never smoked. She has never used smokeless tobacco.    If Smoker - Cessation materials given?  NA    Is Daily aspirin on medication list?:  Yes    Diabetic retinal exam done? Yes   If yes, document in Health Maintenance. Monofilament placed on counter? Yes    Shoes and socks removed? Yes    BP taken with correct size cuff? Yes   Repeated if > 130/80 Yes     Microalbumin performed if applicable? Yes      Mixed hyperlipidemia  This has been a long standing problem, takes lipitor      Monitors diet and tries to follow a low fat diet. Has  been reasonably  compliant w exercise. Lipids have been stable, The problem is controlled. Recent lipid tests were reviewed and are normal. Pertinent negatives include no chest pain, focal sensory loss, focal weakness, leg pain, myalgias or shortness of breath. Advised patient to continue the current instructions or medications. Acquired hypothyroidism  On synthroid,  Patient is compliant w medications, no side effects, effective, provides adequate symptom relief. No new symptoms or problems as noted by patient. The problem is stable, no changes noted by patient. Will consider monitoring labs and refill medications as appropriate. Patient counseled and will continue current plan. Pseudotumor cerebri  Has been stable,  Patient is compliant w medications, no side effects, effective, provides adequate symptom relief. No new symptoms or problems as noted by patient. The problem is stable, no changes noted by patient. Will consider monitoring labs and refill medications as appropriate. Patient counseled and will continue current plan. Morbidly obese ([address]  Counseled regarding diet and exercise,  She understands. Dyssynergia  Seeing psychaitrist,  On lithium, lunesta, and klonopin,   Patient is compliant w medications, no side effects, effective, provides adequate symptom relief. No new symptoms or problems as noted by patient. The problem is stable, no changes noted by patient. Will consider monitoring labs and refill medications as appropriate.  Patient counseled and will continue current plan.    has a blister at the bottom of the rt foot,  Very painful, due to rubbing of the shoe,  No fever or chills, no wounds or open areas,   Review of Systems  ROS: No unusual headaches or allergy symptoms or blurred vision. No prolonged cough. No flushing or facial pain or chest pain,dizziness, dyspnea, palpitations, or chest pain on exertion. No syncope. No nausea or vommitting or diarrhea. No jaundice or abdominal pain, change in bowel habits, black or bloody stools. No dysuria or hematuria or frequency of urination. No myalgias or muscle pain. No numbness, weakness, or tingling. No falls, or loss of consciousness. No weight loss or back pain. No falls. No paresthesias. No joint swelling or redness. No joint pain. No recent weight loss. No focal weakness or sensory deficits or paresthesias, No confusion or altered sensorium. No hematemesis. No hearing loss. No siezures. All other systems were reviewed, and review was negative. Objective:   Physical Exam  /80 (Site: Left Upper Arm, Position: Sitting, Cuff Size: Medium Adult)   Pulse 85   Resp 15   Wt 238 lb (108 kg)   SpO2 99%   BMI 38.43 kg/m²    The physical exam reveals a patient who appears well, alert and oriented x 3, pleasant, cooperative. Vitals are as noted. Head is atraumatic and normocephalic. Eyes reveal normal conjunctiva, cornea normal, pupils are equal and rective to light. Nasal mucosa is normal. Throat is normal without exudates. Ears reveal normal tympanic membranes. Neck is supple and free of adenopathy, or masses. No thyromegaly. No jugular venous distension. Lungs are clear to auscultation, no rales or rhonchi noted. Heart sounds are regular , no murmurs, clicks, gallops or rubs. Abdomen is soft, no tenderness, masses or organomegaly. Bowel sounds are normally heard. Pelvis: normal. Extremities are normal. Peripheral pulses are normal. Screening neurological exam is normal without focal findings. Cranial nerves are intact, reflexes are symmetrical and muscle strength eaqual. Skin is normal without suspicious lesions noted. Assessment:      Type 2 diabetes mellitus without complication, without long-term current use of insulin (HCC)  Diabetes Mellitus Type II:  Home blood sugar records reviewed: fasting range: 110-120. No significant episodes of hypoglycemia. Compliant with medications. No polyuria, polydipsia, visual changes, foot problems, GI upset. Diabetic diet compliance:  compliant most of the time. Current exercise: none. Will check labs, and refill medications as appropriate. Hypertension:  Denies CP, SOB, visual changes, dizziness, palpitations or HA. She is adherent to a low sodium diet. Blood pressure typically runs ok outside of the office. Continue current medications. Hyperlipidemia:  No new myalgias or GI upset on current medications. Lab Results   Component Value Date    LABA1C 6.9 06/10/2019    LABA1C 7.5 12/17/2018    LABA1C 8.2 09/17/2018     Lab Results   Component Value Date    LABMICR <1.20 06/10/2019    LABMICR YES 06/10/2019    CREATININE <0.5 (L) 02/04/2019     Lab Results   Component Value Date    ALT 45 03/27/2019    AST 40 (A) 03/27/2019     No components found for: CHLPL  Lab Results   Component Value Date    TRIG 166 (H) 12/17/2018     Lab Results   Component Value Date    HDL 31 (L) 12/17/2018     Lab Results   Component Value Date    LDLCALC 80 12/17/2018         BP Readings from Last 2 Encounters:   03/17/20 126/80   02/17/20 124/71     Hemoglobin A1C (%)   Date Value   06/10/2019 6.9     Microscopic Examination (no units)   Date Value   06/10/2019 YES     Microalbumin, Random Urine (mg/dL)   Date Value   06/10/2019 <1.20     LDL Calculated (mg/dL)   Date Value   12/17/2018 80              Tobacco use:  Patient  reports that she has never smoked. She has never used smokeless tobacco.    If Smoker - Cessation materials given?  NA    Is Daily aspirin on medication list?:  Yes    Diabetic retinal exam done? Yes   If yes, document in Health Maintenance. Monofilament placed on counter? Yes    Shoes and socks removed? Yes    BP taken with correct size cuff? Yes   Repeated if > 130/80 Yes     Microalbumin performed if applicable? Yes      Mixed hyperlipidemia  This has been a long standing problem, takes lipitor      Monitors diet and tries to follow a low fat diet. Has  been reasonably  compliant w exercise. Lipids have been stable, The problem is controlled. Recent lipid tests were reviewed and are normal. Pertinent negatives include no chest pain, focal sensory loss, focal weakness, leg pain, myalgias or shortness of breath. Advised patient to continue the current instructions or medications. Acquired hypothyroidism  On synthroid,  Patient is compliant w medications, no side effects, effective, provides adequate symptom relief. No new symptoms or problems as noted by patient. The problem is stable, no changes noted by patient. Will consider monitoring labs and refill medications as appropriate. Patient counseled and will continue current plan. Pseudotumor cerebri  Has been stable,  Patient is compliant w medications, no side effects, effective, provides adequate symptom relief. No new symptoms or problems as noted by patient. The problem is stable, no changes noted by patient. Will consider monitoring labs and refill medications as appropriate. Patient counseled and will continue current plan. Morbidly obese ([address]  Counseled regarding diet and exercise,  She understands. Dyssynergia  Seeing psychaitrist,  On lithium, lunesta, and klonopin,   Patient is compliant w medications, no side effects, effective, provides adequate symptom relief. No new symptoms or problems as noted by patient. The problem is stable, no changes noted by patient. Will consider monitoring labs and refill medications as appropriate.  Patient counseled and will continue current plan. foot blister- will refer to podiatry, will start prophylactic antibiotics       Plan:      As above,   Refer to podiatry. Refill meds,  Gabapentin for pain,  Get blood work,   Will start levaquin 500 mg once daily for 10 days. '  F/u in 3 months.         Arielle Ching MD

## 2020-04-01 PROBLEM — T14.8XXA BLOOD BLISTER: Chronic | Status: ACTIVE | Noted: 2020-01-01

## 2020-04-01 PROBLEM — S90.122A HEMATOMA OF TOE OF LEFT FOOT: Chronic | Status: ACTIVE | Noted: 2020-01-01

## 2020-04-01 NOTE — ASSESSMENT & PLAN NOTE
Diabetes Mellitus Type II:  Home blood sugar records reviewed: fasting range: 110-120. No significant episodes of hypoglycemia. Compliant with medications. No polyuria, polydipsia, visual changes, foot problems, GI upset. Diabetic diet compliance:  compliant all of the time. Current exercise: none. Will check labs, and refill medications as appropriate. Hypertension:  Denies CP, SOB, visual changes, dizziness, palpitations or HA. She is adherent to a low sodium diet. Blood pressure typically runs ok outside of the office. Continue current medications. Hyperlipidemia:  No new myalgias or GI upset on current medications.        Lab Results   Component Value Date    LABA1C 7.3 03/17/2020    LABA1C 6.9 06/10/2019    LABA1C 7.5 12/17/2018     Lab Results   Component Value Date    LABMICR <1.20 06/10/2019    LABMICR YES 06/10/2019    CREATININE <0.5 (L) 02/04/2019     Lab Results   Component Value Date    ALT 45 03/27/2019    AST 40 (A) 03/27/2019     No components found for: CHLPL  Lab Results   Component Value Date    TRIG 166 (H) 12/17/2018     Lab Results   Component Value Date    HDL 31 (L) 12/17/2018     Lab Results   Component Value Date    LDLCALC 80 12/17/2018

## 2020-04-01 NOTE — PROGRESS NOTES
Subjective:      Patient ID: Robert Heller is a 48 y.o. female. HPI  Here for an acute and chronic visit,  Blood blister  Has had this on the sole of the foot for 3 weeks,   Came on suddenly. No fever or chills,  No trauma,  No redness or swelling around the blister,  No illness feeling,  Counseled that there are no signs of a gangrene,   She is worried. Advised her to keep the skin over it intact and keep the area clean. Should resolve over time,  Cushion her foot and apply moisturizer. F/u if no better in 4 weeks,     Hematoma of toe of left foot  Has had this on the sole of the foot for 3 weeks,   Came on suddenly. No fever or chills,  No trauma,  No redness or swelling around the blister,  No illness feeling,  Counseled that there are no signs of a gangrene,   She is worried. Advised her to keep the skin over it intact and keep the area clean. Should resolve over time,  Cushion her foot and apply moisturizer. F/u if no better in 4 weeks,       Type 2 diabetes mellitus without complication, without long-term current use of insulin (HCC)  Diabetes Mellitus Type II:  Home blood sugar records reviewed: fasting range: 110-120. No significant episodes of hypoglycemia. Compliant with medications. No polyuria, polydipsia, visual changes, foot problems, GI upset. Diabetic diet compliance:  compliant all of the time. Current exercise: none. Will check labs, and refill medications as appropriate. Hypertension:  Denies CP, SOB, visual changes, dizziness, palpitations or HA. She is adherent to a low sodium diet. Blood pressure typically runs ok outside of the office. Continue current medications. Hyperlipidemia:  No new myalgias or GI upset on current medications.        Lab Results   Component Value Date    LABA1C 7.3 03/17/2020    LABA1C 6.9 06/10/2019    LABA1C 7.5 12/17/2018     Lab Results   Component Value Date    LABMICR <1.20 06/10/2019    LABMICR YES 06/10/2019    CREATININE <0.5 (L) reviewed, and review was negative. Objective:   Physical Exam  /84 (Site: Right Upper Arm, Position: Sitting, Cuff Size: Medium Adult)   Pulse 82   Resp 16   Wt 225 lb (102.1 kg)   SpO2 98%   BMI 36.33 kg/m²    The physical exam reveals a patient who appears well, alert and oriented x 3, pleasant, cooperative. Vitals are as noted. Head is atraumatic and normocephalic. Eyes reveal normal conjunctiva, cornea normal, pupils are equal and rective to light. Nasal mucosa is normal. Throat is normal without exudates. Ears reveal normal tympanic membranes. Neck is supple and free of adenopathy, or masses. No thyromegaly. No jugular venous distension. Lungs are clear to auscultation, no rales or rhonchi noted. Heart sounds are regular , no murmurs, clicks, gallops or rubs. Abdomen is soft, no tenderness, masses or organomegaly. Bowel sounds are normally heard. Pelvis: normal. Extremities are normal. Peripheral pulses are normal. Screening neurological exam is normal without focal findings. Cranial nerves are intact, reflexes are symmetrical and muscle strength eaqual. Skin is normal without suspicious lesions noted. left foot- blood blister- circumscribed, no evidence of infection or gangrene,  Assessment:      Blood blister  Has had this on the sole of the foot for 3 weeks,   Came on suddenly. No fever or chills,  No trauma,  No redness or swelling around the blister,  No illness feeling,  Counseled that there are no signs of a gangrene,   She is worried. Advised her to keep the skin over it intact and keep the area clean. Should resolve over time,  Cushion her foot and apply moisturizer. F/u if no better in 4 weeks,     Hematoma of toe of left foot  Has had this on the sole of the foot for 3 weeks,   Came on suddenly. No fever or chills,  No trauma,  No redness or swelling around the blister,  No illness feeling,  Counseled that there are no signs of a gangrene,   She is worried.   Advised her to keep the

## 2020-04-01 NOTE — ASSESSMENT & PLAN NOTE
Has had this on the sole of the foot for 3 weeks,   Came on suddenly. No fever or chills,  No trauma,  No redness or swelling around the blister,  No illness feeling,  Counseled that there are no signs of a gangrene,   She is worried. Advised her to keep the skin over it intact and keep the area clean. Should resolve over time,  Cushion her foot and apply moisturizer.   F/u if no better in 4 weeks,

## 2020-06-04 NOTE — TELEPHONE ENCOUNTER
Medication:   Requested Prescriptions     Pending Prescriptions Disp Refills    glipiZIDE (GLUCOTROL) 10 MG tablet [Pharmacy Med Name: GLIPIZIDE 10 MG TABLET] 60 tablet 5     Sig: TAKE 1 TABLET BY MOUTH EVERY MORNING AND TAKE 1 TABLET BY MOUTH AT BEDTIME     Last Filled:  12/02/2019    Last appt: 4/1/2020   Next appt: Visit date not found    Last Labs DM:   Lab Results   Component Value Date    LABA1C 7.3 03/17/2020

## 2020-07-07 NOTE — ASSESSMENT & PLAN NOTE
On crestor,  Patient is compliant w medications, no side effects, effective, provides adequate symptom relief. No new symptoms or problems as noted by patient. The problem is stable, no changes noted by patient. Will consider monitoring labs and refill medications as appropriate. Patient counseled and will continue current plan.

## 2020-07-07 NOTE — PROGRESS NOTES
Subjective:      Patient ID: Kristina Cameron is a 48 y.o. female. HPI  Established patient here for a visit to manage acute and chronic medical conditions as detailed below. C/o redness and swelling of the rt leg on the shin for 1 week,  Has a lot of pain there ,  She is concerned about shingles,   She had shingles last year,   No fever or chills,  No falls,  No break in the skin,   Type 2 diabetes mellitus without complication, without long-term current use of insulin ([address]  This problem is stable, reviewed in detail, and advised patient to continue the current instructions or medications. Will continue to closely monitor the situation. Mixed hyperlipidemia  On crestor,  Patient is compliant w medications, no side effects, effective, provides adequate symptom relief. No new symptoms or problems as noted by patient. The problem is stable, no changes noted by patient. Will consider monitoring labs and refill medications as appropriate. Patient counseled and will continue current plan. Acquired hypothyroidism  On synthroid,  Patient is compliant w medications, no side effects, effective, provides adequate symptom relief. No new symptoms or problems as noted by patient. The problem is stable, no changes noted by patient. Will consider monitoring labs and refill medications as appropriate. Patient counseled and will continue current plan. Allergies   Allergen Reactions    Dhea Anaphylaxis     Pt states she coded    Other Hives and Anaphylaxis     Ended up in ICU  \"diazide\"    Penicillins Anaphylaxis    [place] [facility] Other (See Comments)     IVP dye  \"coded\"    Diazoxide Hives    Tetracycline Hives     \"gave me the pseudo tumor\"    Valproic Acid     Acetazolamide Rash and Hives      Review of Systems  ROS: No unusual headaches or allergy symptoms or blurred vision. No prolonged cough. No flushing or facial pain or chest pain,dizziness, dyspnea, palpitations, or chest pain on exertion. No syncope. No nausea or vommitting or diarrhea. No jaundice or abdominal pain, change in bowel habits, black or bloody stools. No dysuria or hematuria or frequency of urination. No myalgias or muscle pain. No numbness, weakness, or tingling. No falls, or loss of consciousness. No weight loss or back pain. No falls. No paresthesias. No joint swelling or redness. No joint pain. No recent weight loss. No focal weakness or sensory deficits or paresthesias, No confusion or altered sensorium. No hematemesis. No hearing loss. No siezures. All other systems were reviewed, and review was negative. Objective:   Physical Exam  /82 (Site: Left Upper Arm, Position: Sitting, Cuff Size: Medium Adult)   Pulse 85   Temp 97.2 °F (36.2 °C) (Infrared)   Wt 232 lb 12.8 oz (105.6 kg)   SpO2 98%   BMI 37.59 kg/m²    The physical exam reveals a patient who appears well, alert and oriented x 3, pleasant, cooperative. Vitals are as noted. Head is atraumatic and normocephalic. Eyes reveal normal conjunctiva, cornea normal, pupils are equal and rective to light. Nasal mucosa is normal. Throat is normal without exudates. Ears reveal normal tympanic membranes. Neck is supple and free of adenopathy, or masses. No thyromegaly. No jugular venous distension. Lungs are clear to auscultation, no rales or rhonchi noted. Heart sounds are regular , no murmurs, clicks, gallops or rubs. Abdomen is soft, no tenderness, masses or organomegaly. Bowel sounds are normally heard. Pelvis: normal. Extremities are normal. Peripheral pulses are normal. Screening neurological exam is normal without focal findings. Cranial nerves are intact, reflexes are symmetrical and muscle strength eaqual. Skin is normal without suspicious lesions noted. Assessment:      Rt leg cellulitis.    Type 2 diabetes mellitus without complication, without long-term current use of insulin ([address]  This problem is stable, reviewed in detail, and advised patient to continue the current instructions or medications. Will continue to closely monitor the situation. Mixed hyperlipidemia  On crestor,  Patient is compliant w medications, no side effects, effective, provides adequate symptom relief. No new symptoms or problems as noted by patient. The problem is stable, no changes noted by patient. Will consider monitoring labs and refill medications as appropriate. Patient counseled and will continue current plan. Acquired hypothyroidism  On synthroid,  Patient is compliant w medications, no side effects, effective, provides adequate symptom relief. No new symptoms or problems as noted by patient. The problem is stable, no changes noted by patient. Will consider monitoring labs and refill medications as appropriate. Patient counseled and will continue current plan. Plan: Will start bactrin DS 1 tab po bid for 10 days,   Elevate lower legs,  Call us if no better in 1 week, . May need further evaluation.          Char Bar MD

## 2020-08-03 NOTE — TELEPHONE ENCOUNTER
Medication:   Requested Prescriptions     Pending Prescriptions Disp Refills    blood glucose test strips (TRUE METRIX BLOOD GLUCOSE TEST) strip [Pharmacy Med Name: TRUE METRIX GLUCOSE TEST STRIP] 50 strip 3     Sig: USE TO TEST ONCE DAILY     Last Filled: 4.6.20    Last appt: 7/7/2020   Next appt: Visit date not found    Last OARRS: No flowsheet data found.

## 2020-08-25 NOTE — PROGRESS NOTES
Subjective  Patient complains of urinary frequency, urgency and dysuria, without flank pain, fever, chills, or abnormal vaginal discharge or bleeding. Symptoms have been there for 3 days. No hematuria. Some back discomfort, mild nausea. No vomitting. Tried drinking a lot of water and some over-the-counter remedies without any improvement. Objective  /82 (Site: Left Upper Arm, Position: Sitting, Cuff Size: Large Adult)   Pulse 64   Wt 228 lb 3.2 oz (103.5 kg)   SpO2 98%   BMI 36.85 kg/m²    Patient is afebrile. Does not look ill. Skin is normal without rash. Cardiovascular exam is normal. Lungs are clear. Abdominal exam is fairly normal except for some mild flank and suprapubic tenderness. There is no abdominal guarding or rebound. Assessment  UTI  Plan  Encourage consumption of lot of fluids. Will start antibiotics and can use antispasmodics if necessary. Suggest taking ascorbic acid supplements on a regular basis if the patient gets these frequently. If patient gets more than 3-4 episodes of UTI in a year then a urological evaluation is in order.

## 2020-09-08 NOTE — TELEPHONE ENCOUNTER
First of all, they did not send us any results. We had to go to care everywhere to get the results. The results are negative for any bacteria.

## 2020-09-08 NOTE — TELEPHONE ENCOUNTER
----- Message from [email or web address] sent at 9/5/2020  2:41 PM EDT -----  Subject: Message to Provider    QUESTIONS  Information for Provider? Pt was seen in ER ([address] [place] [address]) week   of 8/31 (2 times) for strong UTI with blood in urine. Urine was cultured   and results ordered to be sent to [facility]. Pt missed appt & is very upset   also would like to know results and get assistance with meds  ---------------------------------------------------------------------------  --------------  CALL BACK INFO  What is the best way for the office to contact you? OK to leave message on   voicemail  Preferred Call Back Phone Number? [phone]  ---------------------------------------------------------------------------  --------------  SCRIPT ANSWERS  Relationship to Patient?  Self

## 2020-09-17 NOTE — TELEPHONE ENCOUNTER
----- Message from Caroline eMadows sent at 9/16/2020  3:16 PM EDT -----  Subject: Message to Provider    QUESTIONS  Information for Provider? Patient was seen in office on 9/14/2020 for   serve poison LAURENT and Dr. Joyce Jeff gave her steroid pill but its all in her   private area and doesn't seem to be going away making her uncomfortable   and can't sleep.  ---------------------------------------------------------------------------  --------------  CALL BACK INFO  What is the best way for the office to contact you? OK to leave message on   voicemail  Preferred Call Back Phone Number? [phone]  ---------------------------------------------------------------------------  --------------  SCRIPT ANSWERS  Relationship to Patient?  Self

## 2020-09-17 NOTE — TELEPHONE ENCOUNTER
I would try seeing a dermatologist. Also can try using otc ivy creams, or lotions in the mean time. Also can try benadryl tabs or lotions.

## 2020-09-29 PROBLEM — R73.9 HYPERGLYCEMIA: Status: ACTIVE | Noted: 2020-01-01

## 2020-09-29 NOTE — PROGRESS NOTES
Subjective:      Patient ID: Nora Oliver is a 48 y.o. female. HPI  Here for a hospital f/u for hyperglycemia. Hyperglycemia  This is due to steroids, being taken for contact dermatitis. The rash is better,   Sugars were in the 500 and 400. Was seen in the ER was given IV insulin. Will start lantus 25 units every night,   Carefully monitor sugars nd gradually back off the lantus as the sugars start coming down. Call us if no better in 2 weeks. . May need further evaluation. Acquired hypothyroidism  On synthroid,  Patient is compliant w medications, no side effects, effective, provides adequate symptom relief. No new symptoms or problems as noted by patient. The problem is stable, no changes noted by patient. Will consider monitoring labs and refill medications as appropriate. Patient counseled and will continue current plan. Type 2 diabetes mellitus without complication, without long-term current use of insulin (HCC)  Will start lantus   Continue metformin, glucotrol and victoza,   Patient is compliant w medications, no side effects, effective, provides adequate symptom relief. No new symptoms or problems as noted by patient. The problem is stable, no changes noted by patient. Will consider monitoring labs and refill medications as appropriate. Patient counseled and will continue current plan. Mixed hyperlipidemia  This has been a long standing problem, takes lipitor      Monitors diet and tries to follow a low fat diet. Has  been reasonably  compliant w exercise. Lipids have been stable, The problem is controlled. Recent lipid tests were reviewed and are normal. Pertinent negatives include no chest pain, focal sensory loss, focal weakness, leg pain, myalgias or shortness of breath. Advised patient to continue the current instructions or medications. Review of Systems  ROS: No unusual headaches or allergy symptoms or blurred vision. No prolonged cough.  No flushing or facial pain or chest pain,dizziness, dyspnea, palpitations, or chest pain on exertion. No syncope. No nausea or vommitting or diarrhea. No jaundice or abdominal pain, change in bowel habits, black or bloody stools. No dysuria or hematuria or frequency of urination. No myalgias or muscle pain. No numbness, weakness, or tingling. No falls, or loss of consciousness. No weight loss or back pain. No falls. No paresthesias. No joint swelling or redness. No joint pain. No recent weight loss. No focal weakness or sensory deficits or paresthesias, No confusion or altered sensorium. No hematemesis. No hearing loss. No siezures. All other systems were reviewed, and review was negative. Objective:   Physical Exam  /82 (Site: Left Upper Arm, Position: Sitting, Cuff Size: Medium Adult)   Pulse 55   Temp 97.3 °F (36.3 °C) (Oral)   Wt 237 lb 3.2 oz (107.6 kg)   SpO2 98%   BMI 38.30 kg/m²    The physical exam reveals a patient who appears well, alert and oriented x 3, pleasant, cooperative. Vitals are as noted. Head is atraumatic and normocephalic. Eyes reveal normal conjunctiva, cornea normal, pupils are equal and rective to light. Nasal mucosa is normal. Throat is normal without exudates. Ears reveal normal tympanic membranes. Neck is supple and free of adenopathy, or masses. No thyromegaly. No jugular venous distension. Lungs are clear to auscultation, no rales or rhonchi noted. Heart sounds are regular , no murmurs, clicks, gallops or rubs. Abdomen is soft, no tenderness, masses or organomegaly. Bowel sounds are normally heard. Pelvis: normal. Extremities are normal. Peripheral pulses are normal. Screening neurological exam is normal without focal findings. Cranial nerves are intact, reflexes are symmetrical and muscle strength eaqual. Skin is normal without suspicious lesions noted. Assessment:      Hyperglycemia  This is due to steroids, being taken for contact dermatitis.    The rash is better,   Sugars were in the 500 and 400. Was seen in the ER was given IV insulin. Will start lantus 25 units every night,   Carefully monitor sugars nd gradually back off the lantus as the sugars start coming down. Call us if no better in 2 weeks. . May need further evaluation. Acquired hypothyroidism  On synthroid,  Patient is compliant w medications, no side effects, effective, provides adequate symptom relief. No new symptoms or problems as noted by patient. The problem is stable, no changes noted by patient. Will consider monitoring labs and refill medications as appropriate. Patient counseled and will continue current plan. Type 2 diabetes mellitus without complication, without long-term current use of insulin (HCC)  Will start lantus   Continue metformin, glucotrol and victoza,   Patient is compliant w medications, no side effects, effective, provides adequate symptom relief. No new symptoms or problems as noted by patient. The problem is stable, no changes noted by patient. Will consider monitoring labs and refill medications as appropriate. Patient counseled and will continue current plan. Mixed hyperlipidemia  This has been a long standing problem, takes lipitor      Monitors diet and tries to follow a low fat diet. Has  been reasonably  compliant w exercise. Lipids have been stable, The problem is controlled. Recent lipid tests were reviewed and are normal. Pertinent negatives include no chest pain, focal sensory loss, focal weakness, leg pain, myalgias or shortness of breath. Advised patient to continue the current instructions or medications. Plan:      As above,  F/u in 3 months.          Awa Wills MD

## 2020-09-29 NOTE — ASSESSMENT & PLAN NOTE
Will start lantus   Continue metformin, glucotrol and victoza,   Patient is compliant w medications, no side effects, effective, provides adequate symptom relief. No new symptoms or problems as noted by patient. The problem is stable, no changes noted by patient. Will consider monitoring labs and refill medications as appropriate. Patient counseled and will continue current plan.

## 2020-09-29 NOTE — ASSESSMENT & PLAN NOTE
This is due to steroids, being taken for contact dermatitis. The rash is better,   Sugars were in the 500 and 400. Was seen in the ER was given IV insulin. Will start lantus 25 units every night,   Carefully monitor sugars nd gradually back off the lantus as the sugars start coming down. Call us if no better in 2 weeks. . May need further evaluation.

## 2020-10-12 NOTE — TELEPHONE ENCOUNTER
Medication:   Requested Prescriptions     Pending Prescriptions Disp Refills    [facility] Lancets Ultra-Thin 30G MISC [Pharmacy Med Name: [facility] ULTRA THIN 30G LANCETS]  1     Sig: USE ONCE DAILY     Last Filled:     Last appt: 9/29/2020   Next appt: 12/29/2020    Last Labs DM:   Lab Results   Component Value Date    LABA1C 7.2 07/07/2020

## 2020-10-19 NOTE — TELEPHONE ENCOUNTER
----- Message from Oh Brothers sent at 10/19/2020 11:16 AM EDT -----  Subject: Message to Provider    QUESTIONS  Information for Provider? Patient called in questioning about her Cpap   machine. Did not want to be transferred to    but does want a call back for an update about her machine- she said it   should be here any day now    ---------------------------------------------------------------------------  --------------  CALL BACK INFO  What is the best way for the office to contact you? OK to leave message on   voicemail  Preferred Call Back Phone Number? [phone]  ---------------------------------------------------------------------------  --------------  SCRIPT ANSWERS  Relationship to Patient?  Self Eucrisa Counseling: Patient may experience a mild burning sensation during topical application. Eucrisa is not approved in children less than 2 years of age.

## 2020-10-19 NOTE — CARE COORDINATION
often do you have trouble taking your medications the way you have been told to take them?:  I always take them as prescribed. Do you have Home O2 Therapy?:  No      Ability to seek help/take action for Emergent Urgent situations i.e. fire, crime, inclement weather or health crisis. :  Independent  Ability to ambulate to restroom:  Independent  Ability handle personal hygeine needs (bathing/dressing/grooming): Independent  Ability to manage Medications: Independent  Ability to prepare Food Preparation:  Independent  Ability to maintain home (clean home, laundry): Independent  Ability to drive and/or has transportation:  Independent  Ability to do shopping:  Independent  Ability to manage finances: Independent  Is patient able to live independently?:  Yes     Current Housing:  Private Residence        Per the Fall Risk Screening, did the patient have 2 or more falls or 1 fall with injury in the past year?:  Yes  How often do you think you are about to fall and you do NOT fall?  For example, you grab something to stabilize yourself or hold onto a wall/furniture?:  Never  Use of a Mobility Aid:  No  Difficulty walking/impaired gait:  No  Issues with feet or shoes like numbness, edema, shoes not fitting:  Yes  Changes in vision, poor vision or poor lighting in environment:  Yes  Dizziness:  Yes (Comment: occasional)  Other Fall Risk:  No     Frequent urination at night?:  No  Do you use rails/bars?:  No  Do you have a non-slip tub mat?:  No     Are you experiencing loss of meaning?:  No  Are you experiencing loss of hope and peace?:  No     Thinking about your patient's physical health needs, are there any symptoms or problems (risk indicators) you are unsure about that require further investigation?:  Mild vague physical symptoms or problems; but do not impact on daily life or are not of concern to patient   Are the patients physical health problems impacting on their mental well-being?:  Moderate to severe care/services in place and adequately coordinated   Suggested Interventions and [employer] Health: In Process    Diabetes Education: In Process   Fall Risk Prevention: In Process Disease Assocation: In Process   Registered Dietician: In Process   Transportation Services: In Process         Set up/Review an Education Plan, Set up/Review Goals              Prior to Admission medications    Medication Sig Start Date End Date Taking? Authorizing Provider   [facility] Lancets Ultra-Thin 30G MISC USE ONCE DAILY 10/12/20  Yes Yaima Thomas MD   insulin glargine (LANTUS SOLOSTAR) 100 UNIT/ML injection pen Inject 25 Units into the skin nightly 9/29/20  Yes Yaima Thomas MD   VICTOZA 18 MG/3ML SOPN SC injection INJECT 1.2 MG UNDER THE SKIN ONCE DAILY 8/25/20  Yes Yaima Thomas MD   blood glucose test strips (TRUE METRIX BLOOD GLUCOSE TEST) strip USE TO TEST ONCE DAILY 8/3/20  Yes Yaima Thomas MD   glipiZIDE (GLUCOTROL) 10 MG tablet TAKE 1 TABLET BY MOUTH EVERY MORNING AND TAKE 1 TABLET BY MOUTH AT BEDTIME 6/4/20  Yes Yaima Thomas MD   methocarbamol (ROBAXIN) 500 MG tablet Take 500 mg by mouth 1/21/20  Yes Historical Provider, MD   Blood Glucose Monitoring Suppl (ACURA BLOOD GLUCOSE METER) w/Device KIT 1 kit by Does not apply route daily Test Daily Ferny metrics meter, test strips and lancets  DX E11.9 12/11/19  Yes Yaima Thomas MD   metFORMIN (GLUCOPHAGE) 1000 MG tablet TAKE 1 TABLET BY MOUTH TWICE A DAY WITH MEALS 11/27/19  Yes Yaima Thomas MD   TRUE METRIX BLOOD GLUCOSE TEST strip 1 EACH BY IN VITRO ROUTE DAILY 1 STRIP DAILY TRUMETICS E11.9 DIABETICS 11/5/19  Yes Yaima Thomas MD   atorvastatin (LIPITOR) 40 MG tablet TAKE 1 TABLET BY MOUTH EVERY DAY AT NIGHT 8/28/19  Yes Yaima Thomas MD   levothyroxine (SYNTHROID) 200 MCG tablet TAKE 1 TABLET BY MOUTH EVERY DAY 8/7/19  Yes Yaima Thomas MD   nadolol (CORGARD) 20 MG tablet TAKE 1 TABLET BY MOUTH DAILY. daily As needed. Historical Provider, MD       Future Appointments   Date Time Provider Sheyla Hernández   12/29/2020  1:00 PM MD FREDDY Crooks [facility]     ,   Diabetes Assessment    Medic Alert ID:  No  Meal Planning:  Avoidance of concentrated sweets, Carb counting   How often do you test your blood sugar?:  Daily   Do you have barriers with adherence to non-pharmacologic self-management interventions?  (Nutrition/Exercise/Self-Monitoring):  Yes   Have you ever had to go to the ED for symptoms of low blood sugar?:  No       No patient-reported symptoms       and   General Assessment    Do you have any symptoms that are causing concern?:  No

## 2020-10-19 NOTE — CARE COORDINATION
Placed phone call to reach patient to offer CM and she was not available. LM and provided my contact information. Plan  Make second phone call    Haven Webber.  Rosaline Rodriguez RN, BSN, [address] [place] [address] Primary Care  [phone]

## 2020-10-19 NOTE — TELEPHONE ENCOUNTER
----- Message from Jefe Falconer sent at 10/19/2020  5:15 PM EDT -----  Subject: Message to Provider    QUESTIONS  Information for Provider? Patient returning call she can be reached at   [phone]  ---------------------------------------------------------------------------  --------------  [address]  What is the best way for the office to contact you? OK to leave message on   voicemail  Preferred Call Back Phone Number? [phone]  ---------------------------------------------------------------------------  --------------  SCRIPT ANSWERS  Relationship to Patient?  Self

## 2020-10-22 NOTE — CARE COORDINATION
Contacted Amanda Patel and left voicemail regarding Dietitian referral. Left call back number and will follow up as appropriate. Patient returned RD outreach, would prefer information via mail and RD follow up. Will mail Diabetes management - lifestyle and nutrition related handouts to patient and follow up in 2-3 weeks for additional assistance as appropriate. Attached RD contact information with handouts for patient to reach out as necessary.     Caryle Amato MS, RDN, LD, [facility]  [phone]

## 2020-11-05 NOTE — PROGRESS NOTES
the pathology and its treatment options. After sterile prep of the heel, I lanced the blister and drained approximately 5 cc of mostly clear fluid from the bulla. The bulla was deroofed exposing a pink granular base. I instructed her on local wound care. She can ambulate as tolerated. I prescribed her clindamycin and tramadol. I will see her back in a week.

## 2020-11-05 NOTE — CARE COORDINATION
Ambulatory Care Coordination Note  11/5/2020  CM Risk Score: 5  Charlson 10 Year Mortality Risk Score: 10%     ACC: Sharmila Munoz. Audrey Marin, RN  Summary Note: Spoke to patient over the phone. She had just returned from [address] [place] [address] for cellulitis of left foot. She had blister from wearing socks that rubbed her skin. Patient was placed on clindamycin and ultram.  Updated patient's medication list.  Patient is on bedrest for a few days. She verbalized about how painful the I/D of her foot was. Allowed her to vent and offered emotional support. Patient has been using lantus pen without any difficulty. Discussed rotation of sites and she verbalized understanding using teach back method. Her blood sugar today was 170. Patient did not have any questions or concerns. Plan  Completed SDOH  FU on blood sugars    Sharmila Munoz. MIN Alexander, [address] [place] [address] Primary Care     [phone]          Care Coordination Interventions    Program Enrollment:  Complex Care  Referral from Primary Care Provider:  No  Suggested Interventions and Community Resources  [facility]: In Process  Diabetes Education: In Process  Fall Risk Prevention: In Process  Disease Association: In Process  Registered Dietician: In Process  Transportation Support: In Process         Goals Addressed                 This Visit's Progress     Medication Management   On track     I will take my medication as directed.     Barriers: none  Plan for overcoming my barriers: N/A  Confidence: 10/10  Anticipated Goal Completion Date: 05-       Reduce Falls    On track     I will reduce my risk of falls by the following: Remove rugs or use non slip rugs  De-clutter pathways in home, install avoid slip patches on shower floor or bath mat    Barriers: financial  Plan for overcoming my barriers: save money  Confidence: 6/10  Anticipated Goal Completion Date: 6/2021            Prior to Admission medications Medication Sig Start Date End Date Taking? Authorizing Provider   clindamycin (CLEOCIN) 300 MG capsule Take 1 capsule by mouth 3 times daily 11/5/20  Yes Antonio Check, DPM   traMADol (ULTRAM) 50 MG tablet Take 1 tablet by mouth every 6 hours as needed for Pain for up to 7 days. 11/5/20 11/12/20 Yes Antonio Check, DPM   oxyCODONE-acetaminophen (PERCOCET) 5-325 MG per tablet Take 1 tablet by mouth every 6 hours as needed for Pain for up to 3 days. Intended supply: 3 days. Take lowest dose possible to manage pain 11/4/20 11/7/20 Yes Joy Iglesias MD   [facility] Lancets Ultra-Thin 30G MISC USE ONCE DAILY 10/12/20  Yes Joy Iglesias MD   insulin glargine (LANTUS SOLOSTAR) 100 UNIT/ML injection pen Inject 25 Units into the skin nightly 9/29/20  Yes Joy Iglesias MD   VICTOZA 18 MG/3ML SOPN SC injection INJECT 1.2 MG UNDER THE SKIN ONCE DAILY 8/25/20  Yes Joy Iglesias MD   blood glucose test strips (TRUE METRIX BLOOD GLUCOSE TEST) strip USE TO TEST ONCE DAILY 8/3/20  Yes Joy Iglesias MD   glipiZIDE (GLUCOTROL) 10 MG tablet TAKE 1 TABLET BY MOUTH EVERY MORNING AND TAKE 1 TABLET BY MOUTH AT BEDTIME 6/4/20  Yes Joy Iglesias MD   Blood Glucose Monitoring Suppl (ACURA BLOOD GLUCOSE METER) w/Device KIT 1 kit by Does not apply route daily Test Daily Ferny metrics meter, test strips and lancets  DX E11.9 12/11/19  Yes Joy Iglesias MD   metFORMIN (GLUCOPHAGE) 1000 MG tablet TAKE 1 TABLET BY MOUTH TWICE A DAY WITH MEALS 11/27/19  Yes Joy Iglesias MD   TRUE METRIX BLOOD GLUCOSE TEST strip 1 EACH BY IN VITRO ROUTE DAILY 1 STRIP DAILY TRUMETICS E11.9 DIABETICS 11/5/19  Yes Joy Iglesias MD   atorvastatin (LIPITOR) 40 MG tablet TAKE 1 TABLET BY MOUTH EVERY DAY AT NIGHT 8/28/19  Yes Joy Iglesias MD   levothyroxine (SYNTHROID) 200 MCG tablet TAKE 1 TABLET BY MOUTH EVERY DAY 8/7/19  Yes Joy Iglesias MD   nadolol (CORGARD) 20 MG tablet TAKE 1 TABLET BY MOUTH DAILY.  INDICATIONS: PORTAL HYPERTENSION 5/14/19  Yes Historical Provider, MD   pantoprazole (PROTONIX) 40 MG tablet TAKE 1 TABLET BY MOUTH EVERY DAY BEFORE BREAKFAST 5/14/19  Yes Historical Provider, MD   SUMAtriptan Succinate (IMITREX PO) INJECT 0.5ML ONCE AS NEEDED FOR MIGRAINE FOR 1 DOSE. MAY REPEAT IN 1 HOUR. MAX 2/DAY. MAX 2 DAYS/WEE 8/28/18  Yes Historical Provider, MD   blood glucose test strips (ASCENSIA AUTODISC VI;ONE TOUCH ULTRA TEST VI) strip 1 each by In Vitro route daily As needed. Yes Historical Provider, MD   blood glucose monitor strips 100 strips by Other route 2 times daily Test   2  times a day & as needed for symptoms of irregular blood glucose. Patient  Uses  True  Metric  Meter  Needs test strips 3/15/19  Yes GEORGINA Singh CNP   EASY TOUCH PEN NEEDLES 29G X 12MM MISC FOR USE WITH VICTOZIA PEN DAILY 12/17/18  Yes GEORGINA Singh CNP   eszopiclone (LUNESTA) 1 MG TABS Take 1 mg by mouth nightly. .   Yes Historical Provider, MD   lithium (ESKALITH) 450 MG extended release tablet Take 450 mg by mouth 2 times daily    Yes Historical Provider, MD   polyethylene glycol (GLYCOLAX) packet Take by mouth daily as needed    Yes Historical Provider, MD   QUEtiapine (SEROQUEL) 400 MG tablet Take 800 mg by mouth nightly as needed    Yes Historical Provider, MD   topiramate (TOPAMAX) 25 MG tablet Take 25 mg by mouth 2 times daily  4/6/18  Yes Historical Provider, MD   cetirizine (ZYRTEC) 10 MG tablet Take one tablet at bedtime 5/10/18  Yes Historical Provider, MD   clonazePAM (KLONOPIN) 1 MG tablet Take 1 mg by mouth 2 times daily as needed. . 8/12/13  Yes Historical Provider, MD   gabapentin (NEURONTIN) 600 MG tablet Take 1 tablet by mouth daily for 213 days.  3/17/20 10/16/20  Champ Joy MD       Future Appointments   Date Time Provider Sheyla Hernández   11/12/2020  1:20 PM Sulma Toribio DPM [facility]   12/29/2020  1:00 PM MD FREDDY Mendoza [facility]     ,   Diabetes Assessment    Medic Alert ID:  No  Meal Planning:  Avoidance of concentrated sweets, Carb counting   How often do you test your blood sugar?:  Daily   Do you have barriers with adherence to non-pharmacologic self-management interventions?  (Nutrition/Exercise/Self-Monitoring):  Yes   Have you ever had to go to the ED for symptoms of low blood sugar?:  No       No patient-reported symptoms       and   General Assessment    Do you have any symptoms that are causing concern?:  Yes  Progression since Onset:  Unchanged  Reported Symptoms:  Other (Comment: Cellulitis left foot)

## 2020-11-12 NOTE — PROGRESS NOTES
HISTORY OF PRESENT ILLNESS: This is a followup for diabetic foot ulcer on the left foot. The patient denies any fever, chills, however, she has continued having a lot of pain. PHYSICAL EXAM:  An open lesion is present at the medial aspect of the left heel. This measures 4.0 cm x 3.4 cm. This is still superficial with a depth of 0.1 cm and has a pink granular base. There is surrounding erythema which appears to be bright red but no ascending cellulitis. The lesion does not probe to bone and there is no deep sinus tract noted. Drainage is very mild and is mostly serosanguineous in nature. No surrounding signs of infection are noted. No ascending cellulitis is noted. The remainder of the skin is intact, bilateral.    The patient has palpable pedal pulses, bilateral.  There is mild pitting edema of the foot and ankle, bilateral.    Sensation is absent to a 5.07 g monofilament and multiple plantar sides of both feet, bilateral.      ASSESSMENT: Cellulitis, left foot. Diabetic foot ulcer of the left foot. PLAN: I obtained a culture of the drainage from the left heel ulcer. I prescribed her Bactrim DS to be taken 1 capsule p.o. twice daily. I refilled the tramadol. She will continue with the local wound care. We will see her back in a week.

## 2020-11-20 NOTE — CARE COORDINATION
Ambulatory Care Coordination Note  11/20/2020  CM Risk Score: 5  [place]son 10 Year Mortality Risk Score: 10%     ACC: Max Lynette. Karla Harmon RN    Summary Note: Spoke to patient over the phone. She was \"laying around\" with her foot wound. She saw Dr. Xander Leslie yesterday and he said her foot was healing well. Patient's blood sugars have been in range . She says the recent addition of insulin has helped the most.  Patient did not have any questions or concerns. Plan  FU on foot wound  FU on blood sugars    [facility], [address] [place] [address] Primary Care     [phone]          Care Coordination Interventions    Program Enrollment:  Complex Care  Referral from Primary Care Provider:  No  Suggested Interventions and Community Resources  Behavorial Health: In Process  Diabetes Education: In Process  Fall Risk Prevention: In Process  Disease Association: In Process  Registered Dietician: In Process  Transportation Support: In Process         Goals Addressed                 This Visit's Progress     Medication Management   On track     I will take my medication as directed. Barriers: none  Plan for overcoming my barriers: N/A  Confidence: 10/10  Anticipated Goal Completion Date: 05-       Reduce Falls    On track     I will reduce my risk of falls by the following: Remove rugs or use non slip rugs  De-clutter pathways in home, install avoid slip patches on shower floor or bath mat    Barriers: financial  Plan for overcoming my barriers: save money  Confidence: 6/10  Anticipated Goal Completion Date: 6/2021            Prior to Admission medications    Medication Sig Start Date End Date Taking?  Authorizing Provider   sulfamethoxazole-trimethoprim (BACTRIM DS) 800-160 MG per tablet Take 1 tablet by mouth 2 times daily 11/12/20   Amaryllis Cea, DPM   clindamycin (CLEOCIN) 300 MG capsule Take 1 capsule by mouth 3 times daily 11/5/20   Amaryllis Cea, DPM [facility] Lancets Ultra-Thin 30G MISC USE ONCE DAILY 10/12/20   Meena Boggs MD   insulin glargine (LANTUS SOLOSTAR) 100 UNIT/ML injection pen Inject 25 Units into the skin nightly 9/29/20   Meena Boggs MD   VICTOZA 18 MG/3ML SOPN SC injection INJECT 1.2 MG UNDER THE SKIN ONCE DAILY 8/25/20   Meena Boggs MD   blood glucose test strips (TRUE METRIX BLOOD GLUCOSE TEST) strip USE TO TEST ONCE DAILY 8/3/20   Meena Boggs MD   glipiZIDE (GLUCOTROL) 10 MG tablet TAKE 1 TABLET BY MOUTH EVERY MORNING AND TAKE 1 TABLET BY MOUTH AT BEDTIME 6/4/20   Meena Boggs MD   gabapentin (NEURONTIN) 600 MG tablet Take 1 tablet by mouth daily for 213 days. 3/17/20 10/16/20  Meena Boggs MD   Blood Glucose Monitoring Suppl (ACURA BLOOD GLUCOSE METER) w/Device KIT 1 kit by Does not apply route daily Test Daily Ferny metrics meter, test strips and lancets  DX E11.9 12/11/19   Meena Boggs MD   metFORMIN (GLUCOPHAGE) 1000 MG tablet TAKE 1 TABLET BY MOUTH TWICE A DAY WITH MEALS 11/27/19   Meena Boggs MD   TRUE METRIX BLOOD GLUCOSE TEST strip 1 EACH BY IN VITRO ROUTE DAILY 1 STRIP DAILY TRUMETICS E11.9 DIABETICS 11/5/19   Meena Boggs MD   atorvastatin (LIPITOR) 40 MG tablet TAKE 1 TABLET BY MOUTH EVERY DAY AT NIGHT 8/28/19   Meena Boggs MD   levothyroxine (SYNTHROID) 200 MCG tablet TAKE 1 TABLET BY MOUTH EVERY DAY 8/7/19   Meena Boggs MD   nadolol (CORGARD) 20 MG tablet TAKE 1 TABLET BY MOUTH DAILY. INDICATIONS: PORTAL HYPERTENSION 5/14/19   Historical Provider, MD   pantoprazole (PROTONIX) 40 MG tablet TAKE 1 TABLET BY MOUTH EVERY DAY BEFORE BREAKFAST 5/14/19   Historical Provider, MD   SUMAtriptan Succinate (IMITREX PO) INJECT 0.5ML ONCE AS NEEDED FOR MIGRAINE FOR 1 DOSE. MAY REPEAT IN 1 HOUR. MAX 2/DAY. MAX 2 DAYS/WEE 8/28/18   Historical Provider, MD   blood glucose test strips (ASCENSIA AUTODISC VI;ONE TOUCH ULTRA TEST VI) strip 1 each by In Vitro route daily As needed. Historical Provider, MD   blood glucose monitor strips 100 strips by Other route 2 times daily Test   2  times a day & as needed for symptoms of irregular blood glucose. Patient  Uses  True  Metric  Meter  Needs test strips 3/15/19   GEORGINA Herzog CNP   EASY TOUCH PEN NEEDLES 29G X 12MM MISC FOR USE WITH VICTOZIA PEN DAILY 12/17/18   GEORGINA Herzog CNP   eszopiclone (LUNESTA) 1 MG TABS Take 1 mg by mouth nightly. Steve Valdes Historical Provider, MD   lithium (ESKALITH) 450 MG extended release tablet Take 450 mg by mouth 2 times daily     Historical Provider, MD   polyethylene glycol (GLYCOLAX) packet Take by mouth daily as needed     Historical Provider, MD   QUEtiapine (SEROQUEL) 400 MG tablet Take 800 mg by mouth nightly as needed     Historical Provider, MD   topiramate (TOPAMAX) 25 MG tablet Take 25 mg by mouth 2 times daily  4/6/18   Historical Provider, MD   cetirizine (ZYRTEC) 10 MG tablet Take one tablet at bedtime 5/10/18   Historical Provider, MD   clonazePAM (KLONOPIN) 1 MG tablet Take 1 mg by mouth 2 times daily as needed. . 8/12/13   Historical Provider, MD       Future Appointments   Date Time Provider Sheyla Hernández   12/3/2020  1:10 PM Paco Marques DPM [facility]   12/29/2020  1:00 PM MD FREDDY Mcfarland [facility]     ,   Diabetes Assessment    Medic Alert ID:  No  Meal Planning:  Avoidance of concentrated sweets, Carb counting   How often do you test your blood sugar?:  Daily   Do you have barriers with adherence to non-pharmacologic self-management interventions?  (Nutrition/Exercise/Self-Monitoring):  Yes   Have you ever had to go to the ED for symptoms of low blood sugar?:  No       No patient-reported symptoms       and   General Assessment    Do you have any symptoms that are causing concern?:  No

## 2020-12-03 NOTE — PROGRESS NOTES
This is a follow-up for the ruptured blister on her left heel. She denies any new complaints. She has not had any fever chills or night sweats. She also denies pain to her left heel. She has not noticed any drainage over the last week. The ulceration is now closed. She has exfoliating and dry flaky skin on the medial surface of the left heel. There is no surrounding erythema or ascending cellulitis. She has palpable pedal pulses bilateral.  Her sensation remains absent bilateral.    IDDM with peripheral neuropathy, cellulitis left foot which is resolved, diabetic foot ulcer left foot which is resolved. She can stop with the local wound care. I discussed how she should inspect her foot on a daily basis as part of regular diabetic foot care. She will gradually resume activity as tolerated. I will see her back as needed.

## 2020-12-17 NOTE — TELEPHONE ENCOUNTER
----- Message from Gilmar Peralta sent at 12/17/2020 10:03 AM EST -----  Subject: Message to Provider    QUESTIONS  Information for Provider? Pt has a shunt in her head and wants to know if   she should be able to get a flu shot.   ---------------------------------------------------------------------------  --------------  CALL BACK INFO  What is the best way for the office to contact you? OK to leave message on   voicemail  Preferred Call Back Phone Number? [phone]  ---------------------------------------------------------------------------  --------------  SCRIPT ANSWERS  Relationship to Patient?  Self

## 2020-12-17 NOTE — TELEPHONE ENCOUNTER
Medication:   Requested Prescriptions     Pending Prescriptions Disp Refills    glipiZIDE (GLUCOTROL) 10 MG tablet [Pharmacy Med Name: GLIPIZIDE 10 MG TABLET] 60 tablet 5     Sig: TAKE 1 TABLET BY MOUTH EVERY MORNING AND TAKE 1 TABLET BY MOUTH AT BEDTIME    TRUE METRIX BLOOD GLUCOSE TEST strip [Pharmacy Med Name: TRUE METRIX GLUCOSE TEST STRIP] 50 strip 3     Sig: USE TO TEST ONCE DAILY     Last Filled:  08/03/20    Last appt: 11/4/2020   Next appt: 12/29/2020    Last OARRS: No flowsheet data found.

## 2020-12-17 NOTE — TELEPHONE ENCOUNTER
Pt advised. She said that she has concerns, because shunt drains into her heart. Pt was advised to follow up with her Cardiologist for those type of specific questions.

## 2020-12-29 ENCOUNTER — TELEPHONE (OUTPATIENT)
Dept: PRIMARY CARE CLINIC | Age: 55
End: 2020-12-29